# Patient Record
Sex: FEMALE | Race: WHITE | Employment: UNEMPLOYED | URBAN - METROPOLITAN AREA
[De-identification: names, ages, dates, MRNs, and addresses within clinical notes are randomized per-mention and may not be internally consistent; named-entity substitution may affect disease eponyms.]

---

## 2017-07-22 ENCOUNTER — HOSPITAL ENCOUNTER (EMERGENCY)
Age: 37
Discharge: HOME | End: 2017-07-22
Attending: EMERGENCY MEDICINE | Admitting: EMERGENCY MEDICINE
Payer: MEDICAID

## 2017-07-22 VITALS
BODY MASS INDEX: 27.77 KG/M2 | SYSTOLIC BLOOD PRESSURE: 124 MMHG | DIASTOLIC BLOOD PRESSURE: 76 MMHG | WEIGHT: 166.67 LBS | HEART RATE: 72 BPM | OXYGEN SATURATION: 98 % | HEIGHT: 65 IN | TEMPERATURE: 98.1 F | RESPIRATION RATE: 16 BRPM

## 2017-07-22 DIAGNOSIS — Z48.02 REMOVAL OF STAPLE: Primary | ICD-10-CM

## 2017-07-22 DIAGNOSIS — Z51.89 VISIT FOR WOUND CHECK: ICD-10-CM

## 2017-07-22 PROCEDURE — 77030008027

## 2017-07-22 PROCEDURE — 99282 EMERGENCY DEPT VISIT SF MDM: CPT

## 2017-07-22 NOTE — DISCHARGE INSTRUCTIONS
Learning About Stitches and Staples Removal  When are stitches and staples removed? Your doctor will tell you when to have your stitches or staples removed, usually in 7 to 14 days. How long you'll be told to wait will depend on things like where the wound is located, how big and how deep the wound is, and what your general health is like. Do not remove the stitches on your own. Stitches on the face are usually removed within a week. But stitches and staples on other areas of the body, such as on the back or belly or over a joint, may need to stay in place longer, often a week or two. Be sure to follow your doctor's instructions. How are stitches and staples removed? It usually doesn't hurt when the doctor removes the stitches or staples. You may feel a tug as each stitch or staple is removed. · You will either be seated or lying down. · To remove stitches, the doctor will use scissors to cut each of the knots and then pull the threads out. · To remove staples, the doctor will use a tool to take out the staples one at a time. · The area may still feel tender after the stitches or staples are gone. But it should feel better within a few minutes or up to a few hours. What can you expect after stitches and staples are removed? Depending on the type and location of the cut, you will have a scar. Scars usually fade over time. Keep the area clean, but you won't need a bandage. When should you call for help? Call your doctor now or seek immediate medical care if:  · You have new pain, or your pain gets worse. · You have trouble moving the area near the scar. · You have symptoms of infection, such as:  ¨ Increased pain, swelling, warmth, or redness around the scar. ¨ Red streaks leading from the scar. ¨ Pus draining from the scar. ¨ A fever. Watch closely for changes in your health, and be sure to contact your doctor if:  · The scar opens. · You do not get better as expected.   Follow-up care is a key part of your treatment and safety. Be sure to make and go to all appointments, and call your doctor if you do not get better as expected. It's also a good idea to keep a list of the medicines you take. Where can you learn more? Go to http://gualberto-evaristo.info/. Enter O812 in the search box to learn more about \"Learning About Stitches and Staples Removal.\"  Current as of: March 20, 2017  Content Version: 11.3  © 0718-1201 [employer]. Care instructions adapted under license by [employer] (which disclaims liability or warranty for this information). If you have questions about a medical condition or this instruction, always ask your healthcare professional. [address] any warranty or liability for your use of this information.

## 2017-07-22 NOTE — ED PROVIDER NOTES
HPI Comments: Jenny Bee is a 44 y.o. female with PMhx significant for fibromylagia, depression, bipolar, and schizophrenia who presents ambulatory to the ED for follow up and suture removal. She states she had sutures placed in her forehead at [facility] 1 month prior. She denies any issues or drainage from the sutures since the laceration repair. She reports tobacco use, but denies any EtOH and illicit drug use. Pt specifically denies any fever and chills. PCP: PROVIDER UNKNOWN    There are no other complaints, changes or physical findings at this time. The history is provided by the patient. Past Medical History:   Diagnosis Date    Attention and concentration deficit     Bipolar disorder (HCC)     Chronic fatigue     Chronic pain     Fibromyalgia 6 years ago    Dr. Leonardo Mcmahon voices     Dr. Avalos Levels Hx of depression headache     Post herpetic neuralgia     Schizophrenia ([facility] [address]     Shingles 2 weeks ago       No past surgical history on file. Family History:   Problem Relation Age of Onset    Diabetes Mother     Thyroid Disease Mother     COPD Mother     Stroke Paternal Grandfather     Heart Attack Father      At age 39   [address] Bipolar Disorder Father        Social History     Social History    Marital status: SINGLE     Spouse name: N/A    Number of children: N/A    Years of education: N/A     Occupational History    Not on file. Social History Main Topics    Smoking status: Current Every Day Smoker     Packs/day: 1.00     Years: 10.00    Smokeless tobacco: Never Used    Alcohol use Not on file      Comment: not really    Drug use: No    Sexual activity: No     Other Topics Concern    Not on file     Social History Narrative      Has 2 children         ALLERGIES: Review of patient's allergies indicates no known allergies. Review of Systems   Constitutional: Negative. Negative for chills and fever. HENT: Negative. Respiratory: Negative. Cardiovascular: Negative. Gastrointestinal: Negative. Genitourinary: Negative. Musculoskeletal: Negative. Skin: Negative. Neurological: Negative. All other systems reviewed and are negative. Vitals:    07/22/17 1346   BP: 124/76   Pulse: 72   Resp: 16   Temp: 98.1 °F (36.7 °C)   SpO2: 98%   Weight: 75.6 kg (166 lb 10.7 oz)   Height: 5' 5\" (1.651 m)            Physical Exam   Constitutional: She is oriented to person, place, and time. She appears well-developed and well-nourished. No distress. HENT:   Head: Normocephalic and atraumatic. Right Ear: External ear normal.   Left Ear: External ear normal.   Nose: Nose normal.   Mouth/Throat: Oropharynx is clear and moist. No oropharyngeal exudate. Eyes: Conjunctivae and EOM are normal. Pupils are equal, round, and reactive to light. Right eye exhibits no discharge. Left eye exhibits no discharge. No scleral icterus. Neck: Normal range of motion. Neck supple. No JVD present. No tracheal deviation present. Cardiovascular: Normal rate, regular rhythm, normal heart sounds and intact distal pulses. Exam reveals no gallop and no friction rub. No murmur heard. Pulmonary/Chest: Effort normal and breath sounds normal. No respiratory distress. She has no wheezes. She has no rales. She exhibits no tenderness. Abdominal: Soft. Bowel sounds are normal. She exhibits no distension and no mass. There is no tenderness. There is no rebound and no guarding. Musculoskeletal: Normal range of motion. She exhibits no edema or tenderness. Lymphadenopathy:     She has no cervical adenopathy. Neurological: She is alert and oriented to person, place, and time. She has normal reflexes. No cranial nerve deficit. She exhibits normal muscle tone. Coordination normal.   Skin: Skin is warm and dry. She is not diaphoretic. No wound dehiscence  No drainage, bleeding, or erythema   Psychiatric: She has a normal mood and affect.  Her behavior is normal. Judgment and thought content normal.   Nursing note and vitals reviewed. MDM  Number of Diagnoses or Management Options  Removal of staple:   Visit for wound check:   Diagnosis management comments: DDx: wound check, suture removal       Amount and/or Complexity of Data Reviewed  Review and summarize past medical records: yes    Patient Progress  Patient progress: stable    ED Course       Procedures    IMPRESSION:  1. Removal of staple    2. Visit for wound check        PLAN:  1. Discharge home  2. Follow-up Information     Follow up With Details Comments Contact Info    Provider Unknown In 2 days As needed Patient not available to ask          Return to ED if worse     DISCHARGE NOTE:  2:00 PM  The patient is ready for discharge. The patients signs, symptoms, diagnosis, and instructions for discharge have been discussed and the pt has conveyed their understanding. The patient is to follow up as recommended with PCP or return to the ER should their symptoms worsen. Plan has been discussed and patient has conveyed their agreement. This note is prepared by Oswald Boyd, acting as Scribe for [employer]. CHARLIE Rojo: The scribe's documentation has been prepared under my direction and personally reviewed by me in its entirety. I confirm that the note above accurately reflects all work, treatment, procedures, and medical decision making performed by me.

## 2018-09-26 ENCOUNTER — HOSPITAL ENCOUNTER (INPATIENT)
Age: 38
LOS: 3 days | Discharge: PSYCHIATRIC OR SUBSTANCE USE FACILITY | DRG: 042 | End: 2018-09-29
Attending: EMERGENCY MEDICINE | Admitting: INTERNAL MEDICINE
Payer: MEDICAID

## 2018-09-26 ENCOUNTER — APPOINTMENT (OUTPATIENT)
Dept: GENERAL RADIOLOGY | Age: 38
DRG: 042 | End: 2018-09-26
Attending: PHYSICIAN ASSISTANT
Payer: MEDICAID

## 2018-09-26 DIAGNOSIS — M25.551 RIGHT HIP PAIN: ICD-10-CM

## 2018-09-26 DIAGNOSIS — N39.0 URINARY TRACT INFECTION WITHOUT HEMATURIA, SITE UNSPECIFIED: ICD-10-CM

## 2018-09-26 DIAGNOSIS — R53.1 WEAKNESS: Primary | ICD-10-CM

## 2018-09-26 DIAGNOSIS — F99 PSYCHIATRIC ILLNESS: Chronic | ICD-10-CM

## 2018-09-26 PROBLEM — G24.9 DYSTONIA: Status: ACTIVE | Noted: 2018-09-26

## 2018-09-26 PROBLEM — G89.29 CHRONIC PAIN: Status: ACTIVE | Noted: 2018-09-26

## 2018-09-26 LAB
ALBUMIN SERPL-MCNC: 4.5 G/DL (ref 3.5–5)
ALBUMIN/GLOB SERPL: 1.3 {RATIO} (ref 1.1–2.2)
ALP SERPL-CCNC: 52 U/L (ref 45–117)
ALT SERPL-CCNC: 49 U/L (ref 12–78)
ANION GAP SERPL CALC-SCNC: 12 MMOL/L (ref 5–15)
APPEARANCE UR: ABNORMAL
AST SERPL-CCNC: 48 U/L (ref 15–37)
ATRIAL RATE: 85 BPM
BACTERIA URNS QL MICRO: ABNORMAL /HPF
BASOPHILS # BLD: 0 K/UL (ref 0–0.1)
BASOPHILS NFR BLD: 0 % (ref 0–1)
BILIRUB SERPL-MCNC: 0.9 MG/DL (ref 0.2–1)
BILIRUB UR QL CFM: NEGATIVE
BUN SERPL-MCNC: 9 MG/DL (ref 6–20)
BUN/CREAT SERPL: 8 (ref 12–20)
CALCIUM SERPL-MCNC: 9.7 MG/DL (ref 8.5–10.1)
CALCULATED R AXIS, ECG10: -17 DEGREES
CALCULATED T AXIS, ECG11: -27 DEGREES
CAOX CRY URNS QL MICRO: ABNORMAL
CHLORIDE SERPL-SCNC: 99 MMOL/L (ref 97–108)
CO2 SERPL-SCNC: 26 MMOL/L (ref 21–32)
COLOR UR: ABNORMAL
CREAT SERPL-MCNC: 1.06 MG/DL (ref 0.55–1.02)
DIAGNOSIS, 93000: NORMAL
DIFFERENTIAL METHOD BLD: ABNORMAL
EOSINOPHIL # BLD: 0 K/UL (ref 0–0.4)
EOSINOPHIL NFR BLD: 0 % (ref 0–7)
EPITH CASTS URNS QL MICRO: ABNORMAL /LPF
ERYTHROCYTE [DISTWIDTH] IN BLOOD BY AUTOMATED COUNT: 12.1 % (ref 11.5–14.5)
GLOBULIN SER CALC-MCNC: 3.6 G/DL (ref 2–4)
GLUCOSE SERPL-MCNC: 97 MG/DL (ref 65–100)
GLUCOSE UR STRIP.AUTO-MCNC: NEGATIVE MG/DL
HCG UR QL: NEGATIVE
HCT VFR BLD AUTO: 40.3 % (ref 35–47)
HGB BLD-MCNC: 14.2 G/DL (ref 11.5–16)
HGB UR QL STRIP: NEGATIVE
IMM GRANULOCYTES # BLD: 0 K/UL (ref 0–0.04)
IMM GRANULOCYTES NFR BLD AUTO: 0 % (ref 0–0.5)
KETONES UR QL STRIP.AUTO: 40 MG/DL
LEUKOCYTE ESTERASE UR QL STRIP.AUTO: ABNORMAL
LYMPHOCYTES # BLD: 1.6 K/UL (ref 0.8–3.5)
LYMPHOCYTES NFR BLD: 16 % (ref 12–49)
MAGNESIUM SERPL-MCNC: 2.1 MG/DL (ref 1.6–2.4)
MCH RBC QN AUTO: 32.2 PG (ref 26–34)
MCHC RBC AUTO-ENTMCNC: 35.2 G/DL (ref 30–36.5)
MCV RBC AUTO: 91.4 FL (ref 80–99)
MONOCYTES # BLD: 0.7 K/UL (ref 0–1)
MONOCYTES NFR BLD: 7 % (ref 5–13)
MUCOUS THREADS URNS QL MICRO: ABNORMAL /LPF
NEUTS SEG # BLD: 7.7 K/UL (ref 1.8–8)
NEUTS SEG NFR BLD: 76 % (ref 32–75)
NITRITE UR QL STRIP.AUTO: POSITIVE
NRBC # BLD: 0 K/UL (ref 0–0.01)
NRBC BLD-RTO: 0 PER 100 WBC
PH UR STRIP: 6 [PH] (ref 5–8)
PLATELET # BLD AUTO: 254 K/UL (ref 150–400)
PMV BLD AUTO: 10.4 FL (ref 8.9–12.9)
POTASSIUM SERPL-SCNC: 3.6 MMOL/L (ref 3.5–5.1)
PROT SERPL-MCNC: 8.1 G/DL (ref 6.4–8.2)
PROT UR STRIP-MCNC: ABNORMAL MG/DL
Q-T INTERVAL, ECG07: 348 MS
QRS DURATION, ECG06: 62 MS
QTC CALCULATION (BEZET), ECG08: 408 MS
RBC # BLD AUTO: 4.41 M/UL (ref 3.8–5.2)
RBC #/AREA URNS HPF: ABNORMAL /HPF (ref 0–5)
SODIUM SERPL-SCNC: 137 MMOL/L (ref 136–145)
SP GR UR REFRACTOMETRY: 1.02 (ref 1–1.03)
UA: UC IF INDICATED,UAUC: ABNORMAL
UROBILINOGEN UR QL STRIP.AUTO: 1 EU/DL (ref 0.2–1)
VENTRICULAR RATE, ECG03: 83 BPM
WBC # BLD AUTO: 10.1 K/UL (ref 3.6–11)
WBC URNS QL MICRO: ABNORMAL /HPF (ref 0–4)

## 2018-09-26 PROCEDURE — 87077 CULTURE AEROBIC IDENTIFY: CPT | Performed by: PHYSICIAN ASSISTANT

## 2018-09-26 PROCEDURE — 36415 COLL VENOUS BLD VENIPUNCTURE: CPT | Performed by: PHYSICIAN ASSISTANT

## 2018-09-26 PROCEDURE — 97116 GAIT TRAINING THERAPY: CPT

## 2018-09-26 PROCEDURE — 97161 PT EVAL LOW COMPLEX 20 MIN: CPT

## 2018-09-26 PROCEDURE — G8980 MOBILITY D/C STATUS: HCPCS

## 2018-09-26 PROCEDURE — 81001 URINALYSIS AUTO W/SCOPE: CPT | Performed by: PHYSICIAN ASSISTANT

## 2018-09-26 PROCEDURE — 74011250637 HC RX REV CODE- 250/637: Performed by: HOSPITALIST

## 2018-09-26 PROCEDURE — 87086 URINE CULTURE/COLONY COUNT: CPT | Performed by: PHYSICIAN ASSISTANT

## 2018-09-26 PROCEDURE — 74011250636 HC RX REV CODE- 250/636: Performed by: HOSPITALIST

## 2018-09-26 PROCEDURE — 96361 HYDRATE IV INFUSION ADD-ON: CPT

## 2018-09-26 PROCEDURE — 74011250636 HC RX REV CODE- 250/636: Performed by: INTERNAL MEDICINE

## 2018-09-26 PROCEDURE — 83735 ASSAY OF MAGNESIUM: CPT | Performed by: PHYSICIAN ASSISTANT

## 2018-09-26 PROCEDURE — 74011250637 HC RX REV CODE- 250/637: Performed by: INTERNAL MEDICINE

## 2018-09-26 PROCEDURE — 96374 THER/PROPH/DIAG INJ IV PUSH: CPT

## 2018-09-26 PROCEDURE — 74011250637 HC RX REV CODE- 250/637: Performed by: PHYSICIAN ASSISTANT

## 2018-09-26 PROCEDURE — 85025 COMPLETE CBC W/AUTO DIFF WBC: CPT | Performed by: PHYSICIAN ASSISTANT

## 2018-09-26 PROCEDURE — 65660000000 HC RM CCU STEPDOWN

## 2018-09-26 PROCEDURE — 96372 THER/PROPH/DIAG INJ SC/IM: CPT

## 2018-09-26 PROCEDURE — 80053 COMPREHEN METABOLIC PANEL: CPT | Performed by: PHYSICIAN ASSISTANT

## 2018-09-26 PROCEDURE — 81025 URINE PREGNANCY TEST: CPT

## 2018-09-26 PROCEDURE — 99285 EMERGENCY DEPT VISIT HI MDM: CPT

## 2018-09-26 PROCEDURE — G8979 MOBILITY GOAL STATUS: HCPCS

## 2018-09-26 PROCEDURE — 93005 ELECTROCARDIOGRAM TRACING: CPT

## 2018-09-26 PROCEDURE — G8978 MOBILITY CURRENT STATUS: HCPCS

## 2018-09-26 PROCEDURE — 74011000258 HC RX REV CODE- 258: Performed by: INTERNAL MEDICINE

## 2018-09-26 PROCEDURE — 87186 SC STD MICRODIL/AGAR DIL: CPT | Performed by: PHYSICIAN ASSISTANT

## 2018-09-26 PROCEDURE — 74011250636 HC RX REV CODE- 250/636: Performed by: PHYSICIAN ASSISTANT

## 2018-09-26 RX ORDER — KETOROLAC TROMETHAMINE 30 MG/ML
15 INJECTION, SOLUTION INTRAMUSCULAR; INTRAVENOUS
Status: COMPLETED | OUTPATIENT
Start: 2018-09-26 | End: 2018-09-26

## 2018-09-26 RX ORDER — ASPIRIN 81 MG/1
81 TABLET ORAL
COMMUNITY

## 2018-09-26 RX ORDER — IBUPROFEN 200 MG
200 TABLET ORAL
Status: DISCONTINUED | OUTPATIENT
Start: 2018-09-26 | End: 2018-09-29 | Stop reason: HOSPADM

## 2018-09-26 RX ORDER — IBUPROFEN 200 MG
200 TABLET ORAL
COMMUNITY

## 2018-09-26 RX ORDER — SODIUM CHLORIDE 0.9 % (FLUSH) 0.9 %
5-10 SYRINGE (ML) INJECTION AS NEEDED
Status: DISCONTINUED | OUTPATIENT
Start: 2018-09-26 | End: 2018-09-29 | Stop reason: HOSPADM

## 2018-09-26 RX ORDER — KETOROLAC TROMETHAMINE 10 MG/1
10 TABLET, FILM COATED ORAL
Qty: 15 TAB | Refills: 0 | Status: SHIPPED | OUTPATIENT
Start: 2018-09-26 | End: 2018-09-29

## 2018-09-26 RX ORDER — ONDANSETRON 2 MG/ML
4 INJECTION INTRAMUSCULAR; INTRAVENOUS
Status: DISCONTINUED | OUTPATIENT
Start: 2018-09-26 | End: 2018-09-29 | Stop reason: HOSPADM

## 2018-09-26 RX ORDER — BENZTROPINE MESYLATE 1 MG/ML
1 INJECTION INTRAMUSCULAR; INTRAVENOUS EVERY 12 HOURS
Status: COMPLETED | OUTPATIENT
Start: 2018-09-26 | End: 2018-09-27

## 2018-09-26 RX ORDER — CEPHALEXIN 250 MG/1
500 CAPSULE ORAL
Status: COMPLETED | OUTPATIENT
Start: 2018-09-26 | End: 2018-09-26

## 2018-09-26 RX ORDER — DIPHENHYDRAMINE HCL 25 MG
50-100 CAPSULE ORAL
COMMUNITY
End: 2018-10-05

## 2018-09-26 RX ORDER — ACETAMINOPHEN 325 MG/1
650 TABLET ORAL
Status: DISCONTINUED | OUTPATIENT
Start: 2018-09-26 | End: 2018-09-29 | Stop reason: HOSPADM

## 2018-09-26 RX ORDER — SODIUM CHLORIDE 0.9 % (FLUSH) 0.9 %
5-10 SYRINGE (ML) INJECTION EVERY 8 HOURS
Status: DISCONTINUED | OUTPATIENT
Start: 2018-09-26 | End: 2018-09-29 | Stop reason: HOSPADM

## 2018-09-26 RX ORDER — ZOLPIDEM TARTRATE 5 MG/1
5 TABLET ORAL ONCE
Status: COMPLETED | OUTPATIENT
Start: 2018-09-26 | End: 2018-09-26

## 2018-09-26 RX ORDER — KETOROLAC TROMETHAMINE 30 MG/ML
15 INJECTION, SOLUTION INTRAMUSCULAR; INTRAVENOUS
Status: DISCONTINUED | OUTPATIENT
Start: 2018-09-26 | End: 2018-09-26

## 2018-09-26 RX ORDER — CEPHALEXIN 500 MG/1
500 CAPSULE ORAL 2 TIMES DAILY
Qty: 14 CAP | Refills: 0 | Status: SHIPPED | OUTPATIENT
Start: 2018-09-26 | End: 2018-09-29

## 2018-09-26 RX ORDER — DIPHENHYDRAMINE HYDROCHLORIDE 50 MG/ML
25 INJECTION, SOLUTION INTRAMUSCULAR; INTRAVENOUS
Status: COMPLETED | OUTPATIENT
Start: 2018-09-26 | End: 2018-09-26

## 2018-09-26 RX ORDER — HEPARIN SODIUM 5000 [USP'U]/ML
5000 INJECTION, SOLUTION INTRAVENOUS; SUBCUTANEOUS EVERY 8 HOURS
Status: DISCONTINUED | OUTPATIENT
Start: 2018-09-26 | End: 2018-09-29 | Stop reason: HOSPADM

## 2018-09-26 RX ORDER — ASPIRIN 81 MG/1
81 TABLET ORAL
Status: DISCONTINUED | OUTPATIENT
Start: 2018-09-26 | End: 2018-09-29 | Stop reason: HOSPADM

## 2018-09-26 RX ADMIN — KETOROLAC TROMETHAMINE 15 MG: 30 INJECTION, SOLUTION INTRAMUSCULAR at 21:30

## 2018-09-26 RX ADMIN — ZOLPIDEM TARTRATE 5 MG: 5 TABLET ORAL at 21:28

## 2018-09-26 RX ADMIN — CEFTRIAXONE 1 G: 1 INJECTION, POWDER, FOR SOLUTION INTRAMUSCULAR; INTRAVENOUS at 18:04

## 2018-09-26 RX ADMIN — KETOROLAC TROMETHAMINE 15 MG: 30 INJECTION, SOLUTION INTRAMUSCULAR at 12:18

## 2018-09-26 RX ADMIN — BENZTROPINE MESYLATE 1 MG: 1 INJECTION, SOLUTION INTRAMUSCULAR; INTRAVENOUS at 18:27

## 2018-09-26 RX ADMIN — SODIUM CHLORIDE 1000 ML: 900 INJECTION, SOLUTION INTRAVENOUS at 13:25

## 2018-09-26 RX ADMIN — HEPARIN SODIUM 5000 UNITS: 5000 INJECTION INTRAVENOUS; SUBCUTANEOUS at 21:28

## 2018-09-26 RX ADMIN — ACETAMINOPHEN 650 MG: 325 TABLET ORAL at 18:03

## 2018-09-26 RX ADMIN — Medication 10 ML: at 21:29

## 2018-09-26 RX ADMIN — DIPHENHYDRAMINE HYDROCHLORIDE 25 MG: 50 INJECTION, SOLUTION INTRAMUSCULAR; INTRAVENOUS at 14:02

## 2018-09-26 RX ADMIN — Medication 10 ML: at 13:27

## 2018-09-26 RX ADMIN — CEPHALEXIN 500 MG: 250 CAPSULE ORAL at 16:09

## 2018-09-26 NOTE — ED NOTES
Pt medicated as ordered. RN called dietary to check on pt food tray, which should be here shortly. No further needs expressed.

## 2018-09-26 NOTE — ED NOTES
Pt medicated as ordered. IV placed in right hand. Pt not able to urinate. No further needs expressed.

## 2018-09-26 NOTE — IP AVS SNAPSHOT
[address] [facility] 
[phone] Patient: Argenis Alanis MRN: [id number] WVA:[birth year] About your hospitalization You were admitted on:  September 26, 2018 You last received care in the:  [facility] NEUROSCIENCE TELEMETRY You were discharged on:  September 29, 2018 Why you were hospitalized Your primary diagnosis was:  Not on File Your diagnoses also included:  Dystonia, Uti (Urinary Tract Infection), Chronic Pain, Fibromyalgia, Psychiatric Illness Follow-up Information Follow up With Details Comments Contact Info [place] Medicaid Transportation   CALL THIS NUMBER 3 DAYS PRIOR TO APPOINTMENTS FOR TRANSPORTATION TO AND FROM APPOINTMENTS [phone] Gail Minor MD Go on 10/4/2018 Please follow up on October 4, 2018 1:00 arriving at 12:30 to register as a new patient with photo ID, insurance card and current list of medication  [address] [facility] [address] [phone] Dick Cazares MD   [address] [place] [address] 
[phone] Your Scheduled Appointments Thursday October 04, 2018  1:00 PM EDT PHYSICAL PRE OP with Gail Minor MD  
. [address] [facility] 205 ([facility]) [address], [address] [address]  
[phone] Discharge Orders None A check wiliam indicates which time of day the medication should be taken. My Medications START taking these medications Instructions Each Dose to Equal  
 Morning Noon Evening Bedtime  
 benztropine 1 mg tablet Commonly known as:  COGENTIN Your last dose was: Your next dose is: Take 1 Tab by mouth two (2) times a day. 1 mg  
    
   
   
   
  
 zolpidem 5 mg tablet Commonly known as:  AMBIEN Your last dose was: Your next dose is: Take 1 Tab by mouth nightly as needed for Sleep. Max Daily Amount: 5 mg.  
 5 mg CONTINUE taking these medications Instructions Each Dose to Equal  
 Morning Noon Evening Bedtime  
 aspirin delayed-release 81 mg tablet Your last dose was: Your next dose is: Take 81 mg by mouth daily as needed for Pain. 81 mg  
    
   
   
   
  
 BENADRYL 25 mg capsule Generic drug:  diphenhydrAMINE Your last dose was: Your next dose is: Take  mg by mouth nightly.  mg  
    
   
   
   
  
 ibuprofen 200 mg tablet Commonly known as:  MOTRIN Your last dose was: Your next dose is: Take 200 mg by mouth every four (4) hours as needed for Pain. 200 mg Where to Get Your Medications Information on where to get these meds will be given to you by the nurse or doctor. ! Ask your nurse or doctor about these medications  
  benztropine 1 mg tablet  
 zolpidem 5 mg tablet Discharge Instructions HOSPITALIST DISCHARGE INSTRUCTIONS 
 
NAME: Saroj Mckeon :  1980 MRN:  [id number] Date/Time:  2018 1:17 PM 
 
ADMIT DATE: 2018 DISCHARGE DATE: 2018 DISCHARGE DIAGNOSIS: 
Difficulty moving all 4 extremities POA- atypical symptoms on Haldol maintenance therapy, cleared by PT/OT for [facility] this admission Suspect dystonia from haldol?- started on Cogentin BID as per Dr Laci Castillo recommendations Psychiatric illness/schizophrenia POA- was started on haldol monthly shots when she was DC from [facility] 1 month ago- now stopped for now, IP Psych recommended for meds optimization per Psychiatry evaluation- Dr Farhad Rasmussen- transfer to [address] [place] [address] Fibromyalgia POA Chronic pain syndrome POA- avoid opioids Probable UTI (urinary tract infection) POA- ruled out with neg Cx (coag neg staph- likely contaminant)- s/p rocephin here, now off it Active Problems: 
  Fibromyalgia (7/12/2012) Dystonia (9/26/2018) UTI (urinary tract infection) (9/26/2018) Chronic pain (9/26/2018) Psychiatric illness (9/26/2018) MEDICATIONS: 
As per medication reconciliation  list 
· It is important that you take the medication exactly as they are prescribed. · Keep your medication in the bottles provided by the pharmacist and keep a list of the medication names, dosages, and times to be taken in your wallet. · Do not take other medications without consulting your doctor. Pain Management: per above medications What to do at [facility] Recommended diet:  Regular Diet Recommended activity: Activity as tolerated If you have questions regarding the hospital related prescriptions or hospital related issues please call Eleazar Henry at . Information obtained by : 
I understand that if any problems occur once I am at home I am to contact my physician. I understand and acknowledge receipt of the instructions indicated above. Physician's or R.N.'s Signature                                                                  Date/Time Patient or Representative Signature                                                          Date/Time [employer] Announcement We are excited to announce that we are making your provider's discharge notes available to you in [employer]. You will see these notes when they are completed and signed by the physician that discharged you from your recent hospital stay.   If you have any questions or concerns about any information you see in [employer], please call the Health Information Department where you were seen or reach out to your Primary Care Provider for more information about your plan of care. Introducing [facility] & HEALTH SERVICES! [address] introduces [employer] patient portal. Now you can access parts of your medical record, email your doctor's office, and request medication refills online. 1. In your internet browser, go to [email or web address] 2. Click on the First Time User? Click Here link in the Sign In box. You will see the New Member Sign Up page. 3. Enter your [employer] Access Code exactly as it appears below. You will not need to use this code after youve completed the sign-up process. If you do not sign up before the expiration date, you must request a new code. · [employer] Access Code: [email or web address] Expires: 12/25/2018 10:47 AM 
 
4. Enter the last four digits of your Social Security Number (xxxx) and Date of Birth (mm/dd/yyyy) as indicated and click Submit. You will be taken to the next sign-up page. 5. Create a [employer] ID. This will be your [employer] login ID and cannot be changed, so think of one that is secure and easy to remember. 6. Create a [employer] password. You can change your password at any time. 7. Enter your Password Reset Question and Answer. This can be used at a later time if you forget your password. 8. Enter your e-mail address. You will receive e-mail notification when new information is available in [address] 9. Click Sign Up. You can now view and download portions of your medical record. 10. Click the Download Summary menu link to download a portable copy of your medical information. If you have questions, please visit the Frequently Asked Questions section of the [employer] website. Remember, [employer] is NOT to be used for urgent needs. For medical emergencies, dial 911. Now available from your iPhone and Android! Introducing Adrian Reilly As a [facility] patient, I wanted to make you aware of our electronic visit tool called Adrian Campbellneedmade. [employer] allows you to connect within minutes with a medical provider 24 hours a day, seven days a week via a mobile device or tablet or logging into a secure website from your computer. You can access Adrian Campbellfin from anywhere in the [place]. A virtual visit might be right for you when you have a simple condition and feel like you just dont want to get out of bed, or cant get away from work for an appointment, when your regular [facility] provider is not available (evenings, weekends or holidays), or when youre out of town and need minor care. Electronic visits cost only $49 and if the [employer] provider determines a prescription is needed to treat your condition, one can be electronically transmitted to a nearby pharmacy*. Please take a moment to enroll today if you have not already done so. The enrollment process is free and takes just a few minutes. To enroll, please download the [employer] mihir to your tablet or phone, or visit [email or web address] to enroll on your computer. And, as an [address] [place] [address] patient with a [employer] account, the results of your visits will be scanned into your electronic medical record and your primary care provider will be able to view the scanned results. We urge you to continue to see your regular [facility] provider for your ongoing medical care. And while your primary care provider may not be the one available when you seek a Adrian Mejiakalyanfin virtual visit, the peace of mind you get from getting a real diagnosis real time can be priceless. For more information on Adrian Robertokalyanfin, view our Frequently Asked Questions (FAQs) at [email or web address]. Sincerely, 
 
Arvell Najjar, MD 
Chief Medical Officer [employer] *:  certain medications cannot be prescribed via Adrian Reilly Providers Seen During Your Hospitalization Provider Specialty Primary office phone Nita Arnold MD Emergency Medicine [phone] Demario Conte MD Internal Medicine [phone] Vane Mosher MD Internal Medicine [phone] Immunizations Administered for This Admission Name Date Influenza Vaccine (Quad) PF 9/29/2018 Your Primary Care Physician (PCP) Primary Care Physician Office Phone Office Fax Tc Sullivan [phone] You are allergic to the following No active allergies Recent Documentation Height Weight Breastfeeding? BMI OB Status Smoking Status 1.651 m 79.4 kg No 29.12 kg/m2 Implant Current Every Day Smoker Emergency Contacts Name Discharge Info Relation Home Work Mobile [address] CAREGIVER [3] Mother [14] [phone] Patient Belongings The following personal items are in your possession at time of discharge: 
  Dental Appliances: None  Visual Aid: Glasses, With patient      Home Medications: None   Jewelry: None  Clothing: Shirt, Pants, Slippers    Other Valuables: Cell Phone, Purse (she will keep it with her refasued to put purse  saf)  Personal Items Sent to Safe: None Please provide this summary of care documentation to your next provider. Signatures-by signing, you are acknowledging that this After Visit Summary has been reviewed with you and you have received a copy. Patient Signature:  ____________________________________________________________ Date:  ____________________________________________________________  
  
Rigo Summers Provider Signature:  ____________________________________________________________ Date:  ____________________________________________________________

## 2018-09-26 NOTE — ED NOTES
TRANSFER - OUT REPORT: 
 
Verbal report given to Soco Jacome RN (name) on Saroj Mckeon  being transferred to [facility] - Neuro Tele(unit) for routine progression of care Report consisted of patients Situation, Background, Assessment and  
Recommendations(SBAR). Information from the following report(s) SBAR, Kardex, ED Summary, [facility] and Recent Results was reviewed with the receiving nurse. Lines:  
Peripheral IV 09/26/18 Right Hand (Active) Site Assessment Clean, dry, & intact 9/26/2018  1:25 PM  
Phlebitis Assessment 0 9/26/2018  1:25 PM  
Infiltration Assessment 0 9/26/2018  1:25 PM  
Dressing Status Clean, dry, & intact 9/26/2018  1:25 PM  
Dressing Type Transparent 9/26/2018  1:25 PM  
Hub Color/Line Status Pink;Flushed 9/26/2018  1:25 PM  
  
 
Opportunity for questions and clarification was provided. Patient transported with: 
 [employer]

## 2018-09-26 NOTE — ED NOTES
Pt via EMS to ED. Pt c/o of tremors that began a month ago. Pt reports tremors have gotten worse to the point where she can no longer care for herself. Pt reports she was hospitalized in August and was given her haloperidol shots. Pt reports hx of bipolar and schizophrenia. Pt denies falling, injury, drug use and alcohol use. Pt reports her right hip is in pain. Pt reports taking benadryl last night for the tremors with some relief.

## 2018-09-26 NOTE — ED NOTES
Medicated as ordered; water provided to drink. PT at bedside facilitating transfer to [facility].

## 2018-09-26 NOTE — PROGRESS NOTES
physical Therapy Emergency Department EVALUATION with CMS G codes Physical Therapy Goals Initiated 9/27/2018 1. Patient will move from supine to sit and sit to supine , scoot up and down and roll side to side in bed with independence within 7 day(s). 2.  Patient will transfer from bed to chair and chair to bed with independence using the least restrictive device within 7 day(s). 3.  Patient will perform sit to stand with independence within 7 day(s). 4.  Patient will ambulate with modified independence for 200 feet with the least restrictive device within 7 day(s). 5.  Patient will ascend/descend 4 stairs with handrail(s) with modified independence within 7 day(s). Patient: Pop Robles (89 y.o. female) Date: 9/26/2018 Primary Diagnosis: There are no admission diagnoses documented for this encounter. Precautions:     
ASSESSMENT : 
Based on the objective data described below, the patient presents with limitations in functional mobility, gait, and activity tolerance. Asked by LOURDES Acosta for eval. 
Pt lives in one story home with 3 steps to enter. She states she could previously function independently without device. When asked how long ago this was, she states one month. Per PA, pt posturing UEs when he was in room, none noted upon PT entry. Pt posturing feet in plantarflexion/inversion but readily moves out of pattern without difficulty when asked to humberto flip flops. Pt able to take resist BLE with isolated movment. However, when asked to dorsiflex directly, moves very little. Pt presents with intermittent tremoring of UEs. Pt able to come to EOB with CGA. She needed only slight min A to scoot L hip fully to EOB. Pt transferred to [facility] with min A of 1. She was able to complete toileting and hygiene using RUE without assist. Pt completes sit to stand with CGA to min A of 1. She amb initially with min A of 2, HHA, with cues to increase postural extension and lengthen steps. After 30', pt able to amb with CGA of 2 only and continued cues. Upon turning to return to room, pt began tremor and increased forward flexion. Given verbal cues and assurance and min A of 2 HHA to aid in correction, pt then able to amb back to room with min A and increased step length, increased pace. Pt sat on EOB; given stool under feet and pt was able to independently push self back on bed using BLEs and BUEs without difficulty. Pt then asked to lie back down on bed and stated \"I can't\". Required mod A of 1 to return to supine. Note: pt drooling for second half of walk; when asked to swallow saliva she states she is trying but can't. Then asks for water and drinks well per nursing. At this time, pt showing inconsistent behaviors but is not presenting able to function w/o assist. Does not appear that assistive device would necessarily decrease fall risk given above description; (9/27 - could trial next session as appropriate). Pt noted to have limited f/u with mental health services; question to what degree this is contributing to her current presentation. Rounded with PA, MD, and CM given pt stating she does not have needed assist at home. CM and PA meeting with pt. If she returns home as PA expects d/c from ED, would recommend close follow with home health services to fully assess function in the home and assist as can be provided with resources. 9/27/18: Pt has been admitted to the hospital. While in acute setting she will benefit from PT to address issues 4x/week. Goals added to POC as noted above. Recommend HH f/u depending up progress. PLAN : 
Discharge Recommendations:  
 
[x]   Home with family/assist 
[]   Skilled nursing facility []   Admission to hospital with rehab likely needed 
[]   Inpatient rehab referral 
[]   Outpatient physical therapy referral 
[x]   Other:HHPT/OT Further Equipment Recommendations for Discharge:  
[]   Rolling walker with 5\" wheels 
[]   Crutches []   Ghazala Michael  
[]   Wheelchair  
[x]   Other: TBD  
 
COMMUNICATION/EDUCATION:  
Communication/Collaboration: 
[x]   Fall prevention education was provided and the patient/caregiver indicated understanding. [x]   Patient/family have participated as able and agree with findings and recommendations. []   Patient is unable to participate in plan of care at this time. Findings and recommendations were discussed with: MD physician and Registered Nurse and  SUBJECTIVE:  
Patient stated I can't.  OBJECTIVE DATA SUMMARY:  
 
Past Medical History:  
Diagnosis Date  Attention and concentration deficit  Bipolar disorder ([facility] [address]  Chronic fatigue  Chronic pain  Fibromyalgia 6 years ago Dr. Ovi Cruz  Hearing voices Dr. Margaret Mandujano  Hx of depression headache  Post herpetic neuralgia  Schizophrenia ([facility] [address]  Shingles 2 weeks ago History reviewed. No pertinent surgical history. Prior Level of Function/Home Situation: Pt lives in one story home with 3 steps to enter. She states she could previously function independently without device. When asked how long ago this was, she states one month. Home Situation Home Environment: Private residence # Steps to Enter: 3 Rails to Enter: Yes One/Two Story Residence: One story Living Alone: No 
Support Systems: Friends \ neighbors (roommate) Patient Expects to be Discharged to[de-identified] Private residence Current DME Used/Available at Home: None Critical Behavior: 
Neurologic State: Alert Orientation Level: Oriented X4 Cognition: Follows commands Strength:   
Strength: Within functional limits (takes resist to LEs; able to isolate mvt) Tone & Sensation:  
Tone:  (postures but moves readily out) Sensation:  (difficult to assess due to pt's demeanor) Range Of Motion: 
AROM:  (limited testing; appears WFL during functional tasks) PROM: Within functional limits Coordination: 
Coordination:  (tremors intermittent) Functional Mobility: 
Bed Mobility: 
  
Supine to Sit: Contact guard assistance (only very little assist at) Sit to Supine: Moderate assistance (States \"I can't\") Transfers: 
Sit to Stand: Contact guard assistance;Minimum assistance;Assist x1 Stand to Sit: Contact guard assistance (given stool able to independently scoot self back on bed) Balance:  
Sitting: Intact Standing: Impaired Standing - Static: Fair Standing - Dynamic : Poor Ambulation/Gait Training: 
Distance (ft): 90 Feet (ft) Assistive Device: Gait belt; Other (comment) (HHA) Ambulation - Level of Assistance: Minimal assistance;Contact guard assistance (CGA to min A of 1-2) Gait Abnormalities: Decreased step clearance (flexed posture) Base of Support: Narrowed (COG shifted forward) Speed/Emma: Pace decreased (<100 feet/min); Shuffled; Slow Step Length: Right shortened;Left shortened Functional Measure: 
Barthel Index: 
 
Bathin Bladder: 10 Bowels: 10 
Groomin Dressin Feeding: 10 Mobility: 10 Stairs: 5 Toilet Use: 5 Transfer (Bed to Chair and Back): 10 Total: 65 Barthel and G-code impairment scale: 
Percentage of impairment CH 
0% CI 
1-19% CJ 
20-39% CK 
40-59% CL 
60-79% CM 
80-99% CN 
100% Barthel Score 0-100 100 99-80 79-60 59-40 20-39 1-19 
 0 Barthel Score 0-20 20 17-19 13-16 9-12 5-8 1-4 0 The Barthel ADL Index: Guidelines 1. The index should be used as a record of what a patient does, not as a record of what a patient could do. 2. The main aim is to establish degree of independence from any help, physical or verbal, however minor and for whatever reason. 3. The need for supervision renders the patient not independent. 4. A patient's performance should be established using the best available evidence.  Asking the patient, friends/relatives and nurses are the usual sources, but direct observation and common sense are also important. However direct testing is not needed. 5. Usually the patient's performance over the preceding 24-48 hours is important, but occasionally longer periods will be relevant. 6. Middle categories imply that the patient supplies over 50 per cent of the effort. 7. Use of aids to be independent is allowed. Teena Tello., Barthel, D.W. (1433). Functional evaluation: the Barthel Index. [facility] (14)2. CHARLES Ryan, Esequiel Padilla.Katelynn., [place], [address] (1999). Measuring the change indisability after inpatient rehabilitation; comparison of the responsiveness of the Barthel Index and Functional [place] Measure. Journal of Neurology, Neurosurgery, and Psychiatry, 66(4), 118-592. JULIAN Arriola, RAMÓN Perkins, & Fabian Aceves MAndreasA. (2004.) Assessment of post-stroke quality of life in cost-effectiveness studies: The usefulness of the Barthel Index and the EuroQoL-5D. [facility], 13, 737-50 In compliance with CMSs Claims Based Outcome Reporting, the following G-code set was chosen for this patient based on their primary functional limitation being treated: The outcome measure chosen to determine the severity of the functional limitation was the Barthel with a score of 65/100 which was correlated with the impairment scale. ? Mobility - Walking and Moving Around:  
  - CURRENT STATUS: CJ - 20%-39% impaired, limited or restricted  - GOAL STATUS: CJ - 20%-39% impaired, limited or restricted  - D/C STATUS:  CJ - 20%-39% impaired, limited or restricted Pain: 
Pain Scale 1: Numeric (0 - 10) Pain Intensity 1: 7 Pain Location 1: Hip;Knee Pain Orientation 1: Right Pain Description 1: Burning;Aching Pain Intervention(s) 1: Emotional support Activity Tolerance:  
See above narrative Please refer to the flowsheet for vital signs taken during this treatment. After treatment:  
[]         Patient left in no apparent distress sitting up in chair 
[x]         Patient left in no apparent distress in bed 
[x]         Call bell left within reach [x]         Nursing notified 
[x]         Caregiver present 
[]         Bed alarm activated Thank you for this referral. 
Miranda Hernandez, PT Time Calculation: 24 mins

## 2018-09-26 NOTE — H&P
Hospitalist Admission Note NAME: Andry Alonso :  1980 MRN:  [id number] Date/Time:  2018 5:50 PM 
 
Patient PCP: PROVIDER UNKNOWN 
______________________________________________________________________ Given the patient's current clinical presentation, I have a high level of concern for decompensation if discharged from the emergency department. Complex decision making was performed, which includes reviewing the patient's available past medical records, laboratory results, and x-ray films. My assessment of this patient's clinical condition and my plan of care is as follows. Assessment / Plan: 
Difficulty moving all 4 extremities Suspect dystonia from haldol Pt takes month haldol injections and claims symptoms started after the dose of haldol that she received after the dose of haldol and she didn't took recent dose considering she was concern that she had a side affect from the haldol Long acting half life is 25 days so she should still have > 25% in her system and likely reason not getting better This is the 2nd ED visit  For the same for worsening symptoms UDS, pregnancy test and CT head negative on  at outside ED facility (I reviewed paper work that she has with her) I called and discussed the case with psychiatry Dr Victoria Jensen, I will give her 1mg IM Benztropin and repeat dose in 12 hours Will monitor on neurotele Dr Damion Hansen to see her personally tomorrow If Benztroponin doesn't work and if psychiatry wants to rule out neurological problems then consider MRI with and without contrast for head and whole spine to rule out MS considering yound age and ask neurology consult. May also need LP 
PT/OT Fibromyalgia Chronic pain Avoid narcotic PRN tylenol and motrin for now 
 
? UTI (urinary tract infection) IV rocephin and f/u cultures Psychiatric illness suspect schizophrenia as was on haldol monthly shots Psych to see the patient as above Code Status: full code Surrogate Decision Maker; Parents DVT Prophylaxis: Lovenox Baseline: functional  
  
Subjective: CHIEF COMPLAINT: difficulty moving all 4 extrimities HISTORY OF PRESENT ILLNESS:    
Melani Bravo is a 43 y.o.  female who presents with difficulty moving all 4 extremities. As per patient, this all started a month ago after haldol and she didn't take her haldol this month as she believe this is all related to haldol. Pt reported going to ED at [address] read in [facility] and after negative workup, sent home. He weakness continues and she called EMS and came to ED [facility] ED for evaluation. Pt denies any fever, chills, nausea, vomiting, diarrhea, chest pain, cough, problems urination. She also reports pain in her legs which is been there for long time. She showed me paper work from [facility] and over there CT head and UDS was negative. We were asked to admit for work up and evaluation of the above problems. Past Medical History:  
Diagnosis Date  Attention and concentration deficit  Bipolar disorder ([facility] [address]  Chronic fatigue  Chronic pain  Fibromyalgia 6 years ago Dr. Johanny Dinero  Hearing voices Dr. Eden Andre  Hx of depression headache  Post herpetic neuralgia  Schizophrenia ([facility] [address]  Shingles 2 weeks ago Past surgical history: no history of any surgeries Social History Substance Use Topics  Smoking status: Current Every Day Smoker Packs/day: 1.00 Years: 10.00  Smokeless tobacco: Never Used  Alcohol use Not on file Comment: not really Family History Problem Relation Age of Onset  Diabetes Mother  Thyroid Disease Mother  COPD Mother  Stroke Paternal Grandfather  Heart Attack Father At age 39  Bipolar Disorder Father No Known Allergies Prior to Admission medications Medication Sig Start Date End Date Taking? Authorizing Provider cephALEXin (KEFLEX) 500 mg capsule Take 1 Cap by mouth two (2) times a day for 7 days. 9/26/18 10/3/18 Yes LOURDES Juarez  
ketorolac (TORADOL) 10 mg tablet Take 1 Tab by mouth every six (6) hours as needed for Pain. 9/26/18  Yes LOURDES Juarez  
diphenhydrAMINE (BENADRYL) 25 mg capsule Take  mg by mouth nightly. Yes Isi Lynn MD  
aspirin delayed-release 81 mg tablet Take 81 mg by mouth daily as needed for Pain. Yes Isi Lynn MD  
ibuprofen (MOTRIN) 200 mg tablet Take 200 mg by mouth every four (4) hours as needed for Pain. Yes Isi Lynn MD  
 
 
REVIEW OF SYSTEMS:    
I am not able to complete the review of systems because: The patient is intubated and sedated The patient has altered mental status due to his acute medical problems The patient has baseline aphasia from prior stroke(s) The patient has baseline dementia and is not reliable historian The patient is in acute medical distress and unable to provide information Total of 12 systems reviewed as follows:   
   POSITIVE= underlined text  Negative = text not underlined General:  fever, chills, sweats, generalized weakness, weight loss/gain,  
   loss of appetite Eyes:    blurred vision, eye pain, loss of vision, double vision ENT:    rhinorrhea, pharyngitis Respiratory:   cough, sputum production, SOB, HEATON, wheezing, pleuritic pain  
Cardiology:   chest pain, palpitations, orthopnea, PND, edema, syncope Gastrointestinal:  abdominal pain, N/V, diarrhea, dysphagia, constipation, bleeding Genitourinary:  frequency, urgency, dysuria, hematuria, incontinence Muskuloskeletal :  arthralgia, myalgia, back pain Hematology:  easy bruising, nose or gum bleeding, lymphadenopathy Dermatological: rash, ulceration, pruritis, color change / jaundice Endocrine:   hot flashes or polydipsia Neurological:  headache, dizziness, confusion, all extrimities weakness, paresthesia, 
 Speech difficulties, memory loss, gait difficulty Psychological: Feelings of anxiety, depression, agitation Objective: VITALS:   
Visit Vitals  BP (!) 154/99  Pulse (!) 102  Temp 98.4 °F (36.9 °C)  Resp 10  
 Ht 5' 5\" (1.651 m)  Wt 79.4 kg (175 lb)  SpO2 97%  BMI 29.12 kg/m2 PHYSICAL EXAM: 
 
General:    Alert, cooperative, no distress, appears stated age. HEENT: Atraumatic, anicteric sclerae, pink conjunctivae No oral ulcers, mucosa moist, throat clear, dentition fair Neck:  Supple, symmetrical,  thyroid: non tender Lungs:   Clear to auscultation bilaterally. No Wheezing or Rhonchi. No rales. Chest wall:  No tenderness  No Accessory muscle use. Heart:   Regular  rhythm,  No  murmur   No edema Abdomen:   Soft, non-tender. Not distended. Bowel sounds normal 
Extremities: No cyanosis. No clubbing,   
  Skin turgor normal, Capillary refill normal, Radial dial pulse 2+ Skin:     Not pale. Not Jaundiced  No rashes Psych:  Good insight. Not depressed. Not anxious or agitated. Neurologic: EOMs intact. No facial asymmetry. No aphasia or slurred speech. Weakness in all 4 extrimities, Sensation grossly intact. Alert and oriented X 4.  
 
_______________________________________________________________________ Care Plan discussed with: 
  Comments Patient y Family RN y   
Care Manager Consultant:  y Psychiatry as above  
_______________________________________________________________________ Expected  Disposition:  
Home with Family HH/PT/OT/RN   
SNF/LTC   
BELIA y  
________________________________________________________________________ TOTAL TIME:  60 Minutes Critical Care Provided     Minutes non procedure based Comments  
 y Reviewed previous records  
>50% of visit spent in counseling and coordination of care y Discussion with patient and family and questions answered ________________________________________________________________________ Signed: Claritza Michael MD 
 
Procedures: see electronic medical records for all procedures/Xrays and details which were not copied into this note but were reviewed prior to creation of Plan. LAB DATA REVIEWED:   
Recent Results (from the past 24 hour(s)) EKG, 12 LEAD, INITIAL Collection Time: 09/26/18 10:58 AM  
Result Value Ref Range Ventricular Rate 83 BPM  
 Atrial Rate 85 BPM  
 QRS Duration 62 ms Q-T Interval 348 ms QTC Calculation (Bezet) 408 ms Calculated R Axis -17 degrees Calculated T Axis -27 degrees Diagnosis Sinus rhythm No previous ECGs available Confirmed by Sriram Hill ([id number]) on 9/26/2018 2:39:37 PM 
  
CBC WITH AUTOMATED DIFF Collection Time: 09/26/18 11:59 AM  
Result Value Ref Range WBC 10.1 3.6 - 11.0 K/uL  
 RBC 4.41 3.80 - 5.20 M/uL  
 HGB 14.2 11.5 - 16.0 g/dL HCT 40.3 35.0 - 47.0 % MCV 91.4 80.0 - 99.0 FL  
 MCH 32.2 26.0 - 34.0 PG  
 MCHC 35.2 30.0 - 36.5 g/dL  
 RDW 12.1 11.5 - 14.5 % PLATELET 097 099 - 436 K/uL MPV 10.4 8.9 - 12.9 FL  
 NRBC 0.0 0  WBC ABSOLUTE NRBC 0.00 0.00 - 0.01 K/uL NEUTROPHILS 76 (H) 32 - 75 % LYMPHOCYTES 16 12 - 49 % MONOCYTES 7 5 - 13 % EOSINOPHILS 0 0 - 7 % BASOPHILS 0 0 - 1 % IMMATURE GRANULOCYTES 0 0.0 - 0.5 % ABS. NEUTROPHILS 7.7 1.8 - 8.0 K/UL  
 ABS. LYMPHOCYTES 1.6 0.8 - 3.5 K/UL  
 ABS. MONOCYTES 0.7 0.0 - 1.0 K/UL  
 ABS. EOSINOPHILS 0.0 0.0 - 0.4 K/UL  
 ABS. BASOPHILS 0.0 0.0 - 0.1 K/UL  
 ABS. IMM. GRANS. 0.0 0.00 - 0.04 K/UL  
 DF AUTOMATED METABOLIC PANEL, COMPREHENSIVE Collection Time: 09/26/18 11:59 AM  
Result Value Ref Range Sodium 137 136 - 145 mmol/L Potassium 3.6 3.5 - 5.1 mmol/L Chloride 99 97 - 108 mmol/L  
 CO2 26 21 - 32 mmol/L Anion gap 12 5 - 15 mmol/L Glucose 97 65 - 100 mg/dL BUN 9 6 - 20 MG/DL  Creatinine 1.06 (H) 0.55 - 1.02 MG/DL  
 BUN/Creatinine ratio 8 (L) 12 - 20 GFR est AA >60 >60 ml/min/1.73m2 GFR est non-AA 58 (L) >60 ml/min/1.73m2 Calcium 9.7 8.5 - 10.1 MG/DL Bilirubin, total 0.9 0.2 - 1.0 MG/DL  
 ALT (SGPT) 49 12 - 78 U/L  
 AST (SGOT) 48 (H) 15 - 37 U/L Alk. phosphatase 52 45 - 117 U/L Protein, total 8.1 6.4 - 8.2 g/dL Albumin 4.5 3.5 - 5.0 g/dL Globulin 3.6 2.0 - 4.0 g/dL A-G Ratio 1.3 1.1 - 2.2 MAGNESIUM Collection Time: 09/26/18 11:59 AM  
Result Value Ref Range Magnesium 2.1 1.6 - 2.4 mg/dL URINALYSIS W/ REFLEX CULTURE Collection Time: 09/26/18  2:17 PM  
Result Value Ref Range Color DARK YELLOW Appearance CLOUDY (A) CLEAR Specific gravity 1.021 1.003 - 1.030    
 pH (UA) 6.0 5.0 - 8.0 Protein TRACE (A) NEG mg/dL Glucose NEGATIVE  NEG mg/dL Ketone 40 (A) NEG mg/dL Blood NEGATIVE  NEG Urobilinogen 1.0 0.2 - 1.0 EU/dL Nitrites POSITIVE (A) NEG Leukocyte Esterase SMALL (A) NEG    
 WBC 5-10 0 - 4 /hpf  
 RBC 0-5 0 - 5 /hpf Epithelial cells MODERATE (A) FEW /lpf Bacteria 3+ (A) NEG /hpf  
 UA:UC IF INDICATED URINE CULTURE ORDERED (A) CNI Mucus TRACE (A) NEG /lpf  
 CA Oxalate crystals FEW (A) NEG    
BILIRUBIN, CONFIRM Collection Time: 09/26/18  2:17 PM  
Result Value Ref Range Bilirubin UA, confirm NEGATIVE  NEG    
HCG URINE, QL. - POC Collection Time: 09/26/18  2:53 PM  
Result Value Ref Range  Pregnancy test,urine (POC) NEGATIVE  NEG

## 2018-09-26 NOTE — PROGRESS NOTES
Pharmacy Clarification of Prior to Admission Medication Regimen The patient was interviewed regarding clarification of the prior to admission medication regimen and was questioned regarding use of any other inhalers, topical products, over the counter medications, herbal medications, vitamin products or ophthalmic/nasal/otic medication use. Information Obtained From: Patient Pertinent Pharmacy Findings: 
? Updated patients preferred outpatient pharmacy to: Patient did not want to provide an outpatient pharmacy. Patient requesting paper prescriptions ? diphenhydrAMINE (BENADRYL) 25 mg capsule: Patient stated she will take 50 mg of this agent QHS and if she wakes up in the middle of the night with tremors she will take another 50 mg. PTA medication list was corrected to the following:  
 
Prior to Admission Medications Prescriptions Last Dose Informant Patient Reported? Taking?  
aspirin delayed-release 81 mg tablet 9/25/2018 at Unknown time Self Yes Yes Sig: Take 81 mg by mouth daily as needed for Pain. diphenhydrAMINE (BENADRYL) 25 mg capsule 9/25/2018 at Unknown time Self Yes Yes Sig: Take  mg by mouth nightly. ibuprofen (MOTRIN) 200 mg tablet 9/24/2018 at Unknown time Self Yes Yes Sig: Take 200 mg by mouth every four (4) hours as needed for Pain. Facility-Administered Medications: None Thank you, 
Alvarez Fontana CPhT Medication History Pharmacy Technician

## 2018-09-26 NOTE — ED PROVIDER NOTES
EMERGENCY DEPARTMENT HISTORY AND PHYSICAL EXAM 
 
 
Date: 9/26/2018 Patient Name: Karo Watkins History of Presenting Illness Chief Complaint Patient presents with  Tremors Pt arrives via EMS. per EMS, pt has had weakness since the end of August and tremors since August.  Pt states she receives haldol shots once a month and this episode of weakness started after her most recent Haldol shot. Pt states \"my muscles are drawing up\" HPI:  Karo Watkins is a 43 y.o. female with generalized weakness and tremors x 1 month. Pt says her sx's have been progressively worsening since getting her last haldol shot approx one month ago. Pt states that she lives at home w/ her room mate. She says she is having a hard time taking care of herself at home due to weakness. She also reports 7/10, constant, non radiating right hip pain. She denies focal weakness, numbness, fever/chills, uti sx's, among other assoc sx's. PCP: PROVIDER UNKNOWN Current Facility-Administered Medications Medication Dose Route Frequency Provider Last Rate Last Dose  sodium chloride (NS) flush 5-10 mL  5-10 mL IntraVENous Q8H LOURDES Wilcox   10 mL at 09/26/18 1327  
 sodium chloride (NS) flush 5-10 mL  5-10 mL IntraVENous PRN LOURDES Wilcox      
 benztropine (COGENTIN) injection 1 mg  1 mg IntraMUSCular Q12H Manuela Jerome MD   1 mg at 09/26/18 1827  cefTRIAXone (ROCEPHIN) 1 g in 0.9% sodium chloride (MBP/ADV) 50 mL  1 g IntraVENous Q24H Bharathi Tovar  mL/hr at 09/26/18 1804 1 g at 09/26/18 1804  aspirin delayed-release tablet 81 mg  81 mg Oral DAILY PRN Manuela Jerome MD      
 sodium chloride (NS) flush 5-10 mL  5-10 mL IntraVENous Q8H Bharathi Tovar MD      
 sodium chloride (NS) flush 5-10 mL  5-10 mL IntraVENous PRN Manuela Jerome MD      
 acetaminophen (TYLENOL) tablet 650 mg  650 mg Oral Q6H PRN Manuela Jerome MD   650 mg at 09/26/18 1803  ondansetron (ZOFRAN) injection 4 mg  4 mg IntraVENous Q4H PRN Danitza Valiente MD      
 heparin (porcine) injection 5,000 Units  5,000 Units SubCUTAneous Q8H Bharathi Tovar MD      
 ibuprofen (MOTRIN) tablet 200 mg  200 mg Oral Q6H PRN Danitza Valiente MD      
 influenza vaccine 2018-19 (6 mos+)(PF) (FLUARIX QUAD/FLULAVAL QUAD) injection 0.5 mL  0.5 mL IntraMUSCular PRIOR TO DISCHARGE Danitza Valiente MD      
 
 
Past History Past Medical History: 
Past Medical History:  
Diagnosis Date  Attention and concentration deficit  Bipolar disorder ([facility] [address]  Chronic fatigue  Chronic pain  Fibromyalgia 6 years ago Dr. Sabrina Eason  Hearing voices Dr. Grace Moralez  Hx of depression headache  Post herpetic neuralgia  Schizophrenia ([facility]  Shingles 2 weeks ago Past Surgical History: 
History reviewed. No pertinent surgical history. Family History: 
Family History Problem Relation Age of Onset  Diabetes Mother  Thyroid Disease Mother  COPD Mother  Stroke Paternal Grandfather  Heart Attack Father At age 39  Bipolar Disorder Father Social History: 
Social History Substance Use Topics  Smoking status: Current Every Day Smoker Packs/day: 1.00 Years: 10.00  Smokeless tobacco: Never Used  Alcohol use None Comment: not really Allergies: 
No Known Allergies Review of Systems Review of Systems Constitutional: Negative for chills, fever and unexpected weight change. Respiratory: Negative for shortness of breath. Cardiovascular: Negative for chest pain and palpitations. Gastrointestinal: Negative for abdominal pain, nausea and vomiting. Musculoskeletal: Negative for arthralgias and myalgias. Skin: Negative for rash. Neurological: Positive for tremors and weakness. Negative for dizziness, seizures, syncope, facial asymmetry, speech difficulty, light-headedness, numbness and headaches. All other systems reviewed and are negative. Physical Exam  
 
Vitals:  
 09/26/18 1500 09/26/18 1530 09/26/18 1648 09/26/18 1800 BP:   (!) 154/99 116/74 Pulse: 83 86 (!) 102 76 Resp: 23 23 10 20 Temp:    99 °F (37.2 °C) SpO2: 99% 98% 97% 97% Weight:      
Height:      
 
Physical Exam  
Constitutional: She is oriented to person, place, and time. She appears well-developed and well-nourished. No distress. HENT:  
Head: Normocephalic and atraumatic. Cardiovascular: Normal rate, regular rhythm and normal heart sounds. Pulmonary/Chest: Effort normal and breath sounds normal.  
Abdominal: Soft. Bowel sounds are normal. She exhibits no distension and no mass. There is no tenderness. There is no rebound and no guarding. Musculoskeletal:  
Right lower extremity: Full ROM and no pain w/ passing ROM. No swelling when compared to other extremity. No swelling, erythema, signs of skin infection/rash or warmth to the touch. No obvious trauma or deformity. 2+ distal pulses, NVI, sensation grossly intact to light touch. No TTP. No pitting edema or crepitus. Pt able to bear weight. Neurological: She is alert and oriented to person, place, and time. No cranial nerve deficit. Generally weak, slow to follow commands, resting tremor Skin: Skin is warm and dry. She is not diaphoretic. Psychiatric: She has a normal mood and affect. Her behavior is normal. Judgment and thought content normal.  
 
 
 
Diagnostic Study Results Labs - Recent Results (from the past 12 hour(s)) EKG, 12 LEAD, INITIAL Collection Time: 09/26/18 10:58 AM  
Result Value Ref Range Ventricular Rate 83 BPM  
 Atrial Rate 85 BPM  
 QRS Duration 62 ms Q-T Interval 348 ms QTC Calculation (Bezet) 408 ms Calculated R Axis -17 degrees Calculated T Axis -27 degrees Diagnosis Sinus rhythm No previous ECGs available Confirmed by Ezekiel Cantor ([id number]) on 9/26/2018 2:39:37 PM 
  
 CBC WITH AUTOMATED DIFF Collection Time: 09/26/18 11:59 AM  
Result Value Ref Range WBC 10.1 3.6 - 11.0 K/uL  
 RBC 4.41 3.80 - 5.20 M/uL  
 HGB 14.2 11.5 - 16.0 g/dL HCT 40.3 35.0 - 47.0 % MCV 91.4 80.0 - 99.0 FL  
 MCH 32.2 26.0 - 34.0 PG  
 MCHC 35.2 30.0 - 36.5 g/dL  
 RDW 12.1 11.5 - 14.5 % PLATELET 362 585 - 996 K/uL MPV 10.4 8.9 - 12.9 FL  
 NRBC 0.0 0  WBC ABSOLUTE NRBC 0.00 0.00 - 0.01 K/uL NEUTROPHILS 76 (H) 32 - 75 % LYMPHOCYTES 16 12 - 49 % MONOCYTES 7 5 - 13 % EOSINOPHILS 0 0 - 7 % BASOPHILS 0 0 - 1 % IMMATURE GRANULOCYTES 0 0.0 - 0.5 % ABS. NEUTROPHILS 7.7 1.8 - 8.0 K/UL  
 ABS. LYMPHOCYTES 1.6 0.8 - 3.5 K/UL  
 ABS. MONOCYTES 0.7 0.0 - 1.0 K/UL  
 ABS. EOSINOPHILS 0.0 0.0 - 0.4 K/UL  
 ABS. BASOPHILS 0.0 0.0 - 0.1 K/UL  
 ABS. IMM. GRANS. 0.0 0.00 - 0.04 K/UL  
 DF AUTOMATED METABOLIC PANEL, COMPREHENSIVE Collection Time: 09/26/18 11:59 AM  
Result Value Ref Range Sodium 137 136 - 145 mmol/L Potassium 3.6 3.5 - 5.1 mmol/L Chloride 99 97 - 108 mmol/L  
 CO2 26 21 - 32 mmol/L Anion gap 12 5 - 15 mmol/L Glucose 97 65 - 100 mg/dL BUN 9 6 - 20 MG/DL Creatinine 1.06 (H) 0.55 - 1.02 MG/DL  
 BUN/Creatinine ratio 8 (L) 12 - 20 GFR est AA >60 >60 ml/min/1.73m2 GFR est non-AA 58 (L) >60 ml/min/1.73m2 Calcium 9.7 8.5 - 10.1 MG/DL Bilirubin, total 0.9 0.2 - 1.0 MG/DL  
 ALT (SGPT) 49 12 - 78 U/L  
 AST (SGOT) 48 (H) 15 - 37 U/L Alk. phosphatase 52 45 - 117 U/L Protein, total 8.1 6.4 - 8.2 g/dL Albumin 4.5 3.5 - 5.0 g/dL Globulin 3.6 2.0 - 4.0 g/dL A-G Ratio 1.3 1.1 - 2.2 MAGNESIUM Collection Time: 09/26/18 11:59 AM  
Result Value Ref Range Magnesium 2.1 1.6 - 2.4 mg/dL URINALYSIS W/ REFLEX CULTURE Collection Time: 09/26/18  2:17 PM  
Result Value Ref Range Color DARK YELLOW Appearance CLOUDY (A) CLEAR Specific gravity 1.021 1.003 - 1.030    
 pH (UA) 6.0 5.0 - 8.0 Protein TRACE (A) NEG mg/dL Glucose NEGATIVE  NEG mg/dL Ketone 40 (A) NEG mg/dL Blood NEGATIVE  NEG Urobilinogen 1.0 0.2 - 1.0 EU/dL Nitrites POSITIVE (A) NEG Leukocyte Esterase SMALL (A) NEG    
 WBC 5-10 0 - 4 /hpf  
 RBC 0-5 0 - 5 /hpf Epithelial cells MODERATE (A) FEW /lpf Bacteria 3+ (A) NEG /hpf  
 UA:UC IF INDICATED URINE CULTURE ORDERED (A) CNI Mucus TRACE (A) NEG /lpf  
 CA Oxalate crystals FEW (A) NEG    
BILIRUBIN, CONFIRM Collection Time: 09/26/18  2:17 PM  
Result Value Ref Range Bilirubin UA, confirm NEGATIVE  NEG    
HCG URINE, QL. - POC Collection Time: 09/26/18  2:53 PM  
Result Value Ref Range Pregnancy test,urine (POC) NEGATIVE  NEG Radiologic Studies - No orders to display CT Results  (Last 48 hours) None CXR Results  (Last 48 hours) None Medical Decision Making I am the first provider for this patient. I reviewed the vital signs, available nursing notes, past medical history, past surgical history, family history and social history. Vital Signs-Reviewed the patient's vital signs. Pulse Oximetry Analysis - 96% on RA Cardiac Monitor: 
Rate: 88 Rhythm: sinus EKG: Interpreted by the EP. Dr. Veronique Rosado Time Interpreted: 10:58 Rate: 83 Rhythm: normal sinus w/o ST T wave changes Interpretation: NSR w/o ST T wave changes Records Reviewed: Nursing Notes and Old Medical Records ED Course:  
Initial assessment performed. The patients presenting problems have been discussed, and they are in agreement with the care plan formulated and outlined with them. I have encouraged them to ask questions as they arise throughout their visit. Available labs, imaging, and vital signs reviewed and read in full detail Vitals:  
 09/26/18 1500 09/26/18 1530 09/26/18 1648 09/26/18 1800 BP:   (!) 154/99 116/74 BP 1 Location:    Left arm BP Patient Position:    At rest  
Pulse: 83 86 (!) 102 76 Resp: 23 23 10 20 Temp:    99 °F (37.2 °C) SpO2: 99% 98% 97% 97% Weight:      
Height:      
 
 
ED CONSULT NOTE:  
Nahed Chavez PA-C spoke with Dr. Kaylee Sabillon, ED Physician Discussed pt's hx, disposition, and available diagnostic and imaging results. Reviewed care plans. Consultant agrees with plans as outlined. ADMIT NOTE: The patient is being admitted to the hospitalist service by Dr. Jeyson De. The results of their tests and reasons for their admission have been discussed with the patient and/or available family. They convey agreement and understanding for the need to be admitted and for their admission diagnosis. Disposition: 
admitted Follow-up Information Follow up With Details Comments Contact Info [employer] Medicaid Transportation   CALL THIS NUMBER 3 DAYS PRIOR TO APPOINTMENTS FOR TRANSPORTATION TO AND FROM APPOINTMENTS [phone] [facility] EMERGENCY DEPT Go to If symptoms worsen [address] [facility] 
[phone] Current Discharge Medication List  
  
START taking these medications Details  
cephALEXin (KEFLEX) 500 mg capsule Take 1 Cap by mouth two (2) times a day for 7 days. Qty: 14 Cap, Refills: 0  
  
ketorolac (TORADOL) 10 mg tablet Take 1 Tab by mouth every six (6) hours as needed for Pain. Qty: 15 Tab, Refills: 0 Provider Notes (Medical Decision Making): Both case management and PT saw and assessed the pt and gave her appropriate information and f/u information. Case management reported pt did not qualify for home health because she does not have a PCP. Pt advised she has had several episodes of waking up on the floor at her home, has been so weak that she has been unable to care for herself, and does not feel safe for discharge home. Procedures: 
Procedures Diagnosis Clinical Impression:  
1. Weakness 2. Urinary tract infection without hematuria, site unspecified 3. Right hip pain _____________________________

## 2018-09-26 NOTE — PROGRESS NOTES
Date of previous inpatient admission/ ED visit? Pt was last seen in the ED at [facility] on 9/12/18 with cc of weakness and slurred speech What brought the patient back to ED? Pt presented to the ED via EMS with cc of tremors Did patient decline recommended services during last admission/ ED visit (if yes, what)? N/A Has patient seen a provider since their last inpatient admission/ED visit (if yes, when)? No 
 
CM Interventions: 
From previous inpatient admission/ED visit: Pt was evaluated at a hospital outside the [facility] system From current inpatient admission/ED visit: CM consulted re: pt's care needs. CM met with pt, introduced self, role. Pt verbalized understanding. Pt verified demographic information on chart. Pt lives with a roommate in a 1 story home. Pt stated she requires assistance with ADL/IADL needs at this time. Pt was previously able to complete daily needs, but stated today she was unable to complete ADL/IADL needs due to tremors. Pt does have diagnoses of bipolar and schizophrenia, but is currently not following with a service for her mental health needs. Pt stated she was to follow up with her local [facility], but has not done so at this time. CM encouraged pt to follow up with her local [facility] for continued services. Pt verbalized understanding. CM offered information re: personal care aides to assist with ADL/IADL needs. Pt verbalized understanding, but stated \"I was hoping to get admitted. \" CM offered a disposition decision has not been made at this time. Pt stated she has no further questions or needs at this time. CM will continue to remain available for support, discharge planning as needed. 15:16 CM consulted re: home health. CM met with pt re: home health options. Pt stated she did not have preference for home health at this time.  CM called [facility] re: referral. Per [facility], pt will need to be followed by a PCP for home health orders. Pt does not have a PCP at this time. CM informed LOURDES Price re: inability to obtain home health services. CM informed pt of need of PCP for home health services. Pt verbalized understanding. CM offered pt a list of personal care aides to assist with ADL/IADL needs. Pt accepted list. Pt stated she has no further questions or needs at this time. CM will continue to remain available for support, discharge planning as needed. Care Management Interventions PCP Verified by CM: Yes Mode of Transport at Discharge: Self Transition of Care Consult (CM Consult): Discharge Planning Discharge Durable Medical Equipment: No 
Physical Therapy Consult: Yes Occupational Therapy Consult: No 
Current Support Network: Own Home Confirm Follow Up Transport: Self Plan discussed with Pt/Family/Caregiver: Yes Discharge Location Discharge Placement: Home [place] [facility] [phone]

## 2018-09-27 PROBLEM — G89.29 CHRONIC PAIN: Chronic | Status: ACTIVE | Noted: 2018-09-26

## 2018-09-27 PROBLEM — F99 PSYCHIATRIC ILLNESS: Chronic | Status: ACTIVE | Noted: 2018-09-26

## 2018-09-27 LAB
ALBUMIN SERPL-MCNC: 3.8 G/DL (ref 3.5–5)
ALBUMIN/GLOB SERPL: 1.2 {RATIO} (ref 1.1–2.2)
ALP SERPL-CCNC: 47 U/L (ref 45–117)
ALT SERPL-CCNC: 45 U/L (ref 12–78)
ANION GAP SERPL CALC-SCNC: 9 MMOL/L (ref 5–15)
AST SERPL-CCNC: 70 U/L (ref 15–37)
BASOPHILS # BLD: 0.1 K/UL (ref 0–0.1)
BASOPHILS NFR BLD: 1 % (ref 0–1)
BILIRUB SERPL-MCNC: 0.8 MG/DL (ref 0.2–1)
BUN SERPL-MCNC: 10 MG/DL (ref 6–20)
BUN/CREAT SERPL: 10 (ref 12–20)
CALCIUM SERPL-MCNC: 8.9 MG/DL (ref 8.5–10.1)
CHLORIDE SERPL-SCNC: 103 MMOL/L (ref 97–108)
CO2 SERPL-SCNC: 25 MMOL/L (ref 21–32)
CREAT SERPL-MCNC: 1 MG/DL (ref 0.55–1.02)
DIFFERENTIAL METHOD BLD: ABNORMAL
EOSINOPHIL # BLD: 0.3 K/UL (ref 0–0.4)
EOSINOPHIL NFR BLD: 4 % (ref 0–7)
ERYTHROCYTE [DISTWIDTH] IN BLOOD BY AUTOMATED COUNT: 12.1 % (ref 11.5–14.5)
GLOBULIN SER CALC-MCNC: 3.1 G/DL (ref 2–4)
GLUCOSE SERPL-MCNC: 94 MG/DL (ref 65–100)
HCT VFR BLD AUTO: 38.1 % (ref 35–47)
HGB BLD-MCNC: 12.9 G/DL (ref 11.5–16)
IMM GRANULOCYTES # BLD: 0 K/UL (ref 0–0.04)
IMM GRANULOCYTES NFR BLD AUTO: 0 % (ref 0–0.5)
LYMPHOCYTES # BLD: 3.7 K/UL (ref 0.8–3.5)
LYMPHOCYTES NFR BLD: 44 % (ref 12–49)
MCH RBC QN AUTO: 31.8 PG (ref 26–34)
MCHC RBC AUTO-ENTMCNC: 33.9 G/DL (ref 30–36.5)
MCV RBC AUTO: 93.8 FL (ref 80–99)
MONOCYTES # BLD: 0.8 K/UL (ref 0–1)
MONOCYTES NFR BLD: 9 % (ref 5–13)
NEUTS SEG # BLD: 3.6 K/UL (ref 1.8–8)
NEUTS SEG NFR BLD: 42 % (ref 32–75)
NRBC # BLD: 0 K/UL (ref 0–0.01)
NRBC BLD-RTO: 0 PER 100 WBC
PLATELET # BLD AUTO: 235 K/UL (ref 150–400)
PMV BLD AUTO: 10.7 FL (ref 8.9–12.9)
POTASSIUM SERPL-SCNC: 3.5 MMOL/L (ref 3.5–5.1)
PROT SERPL-MCNC: 6.9 G/DL (ref 6.4–8.2)
RBC # BLD AUTO: 4.06 M/UL (ref 3.8–5.2)
SODIUM SERPL-SCNC: 137 MMOL/L (ref 136–145)
WBC # BLD AUTO: 8.4 K/UL (ref 3.6–11)

## 2018-09-27 PROCEDURE — 65660000000 HC RM CCU STEPDOWN

## 2018-09-27 PROCEDURE — 74011250636 HC RX REV CODE- 250/636: Performed by: INTERNAL MEDICINE

## 2018-09-27 PROCEDURE — 74011000258 HC RX REV CODE- 258: Performed by: INTERNAL MEDICINE

## 2018-09-27 PROCEDURE — 74011250637 HC RX REV CODE- 250/637: Performed by: EMERGENCY MEDICINE

## 2018-09-27 PROCEDURE — 85025 COMPLETE CBC W/AUTO DIFF WBC: CPT | Performed by: INTERNAL MEDICINE

## 2018-09-27 PROCEDURE — 92610 EVALUATE SWALLOWING FUNCTION: CPT

## 2018-09-27 PROCEDURE — 74011250637 HC RX REV CODE- 250/637: Performed by: INTERNAL MEDICINE

## 2018-09-27 PROCEDURE — 80053 COMPREHEN METABOLIC PANEL: CPT | Performed by: INTERNAL MEDICINE

## 2018-09-27 PROCEDURE — 97166 OT EVAL MOD COMPLEX 45 MIN: CPT

## 2018-09-27 PROCEDURE — 74011250637 HC RX REV CODE- 250/637: Performed by: PSYCHIATRY & NEUROLOGY

## 2018-09-27 PROCEDURE — 97535 SELF CARE MNGMENT TRAINING: CPT

## 2018-09-27 PROCEDURE — 97116 GAIT TRAINING THERAPY: CPT

## 2018-09-27 PROCEDURE — 36415 COLL VENOUS BLD VENIPUNCTURE: CPT | Performed by: INTERNAL MEDICINE

## 2018-09-27 RX ORDER — KETOROLAC TROMETHAMINE 30 MG/ML
15 INJECTION, SOLUTION INTRAMUSCULAR; INTRAVENOUS
Status: COMPLETED | OUTPATIENT
Start: 2018-09-27 | End: 2018-09-27

## 2018-09-27 RX ORDER — BENZTROPINE MESYLATE 1 MG/1
1 TABLET ORAL 2 TIMES DAILY
Status: DISCONTINUED | OUTPATIENT
Start: 2018-09-28 | End: 2018-09-29 | Stop reason: HOSPADM

## 2018-09-27 RX ORDER — ZOLPIDEM TARTRATE 5 MG/1
5 TABLET ORAL
Status: DISCONTINUED | OUTPATIENT
Start: 2018-09-27 | End: 2018-09-29 | Stop reason: HOSPADM

## 2018-09-27 RX ORDER — BENZTROPINE MESYLATE 1 MG/1
2 TABLET ORAL
Status: COMPLETED | OUTPATIENT
Start: 2018-09-27 | End: 2018-09-27

## 2018-09-27 RX ORDER — KETOROLAC TROMETHAMINE 30 MG/ML
15 INJECTION, SOLUTION INTRAMUSCULAR; INTRAVENOUS
Status: DISCONTINUED | OUTPATIENT
Start: 2018-09-27 | End: 2018-09-29 | Stop reason: HOSPADM

## 2018-09-27 RX ADMIN — BENZTROPINE MESYLATE 1 MG: 1 INJECTION, SOLUTION INTRAMUSCULAR; INTRAVENOUS at 06:31

## 2018-09-27 RX ADMIN — HEPARIN SODIUM 5000 UNITS: 5000 INJECTION INTRAVENOUS; SUBCUTANEOUS at 21:30

## 2018-09-27 RX ADMIN — HEPARIN SODIUM 5000 UNITS: 5000 INJECTION INTRAVENOUS; SUBCUTANEOUS at 05:57

## 2018-09-27 RX ADMIN — BENZTROPINE MESYLATE 2 MG: 1 TABLET ORAL at 19:31

## 2018-09-27 RX ADMIN — Medication 10 ML: at 13:17

## 2018-09-27 RX ADMIN — HEPARIN SODIUM 5000 UNITS: 5000 INJECTION INTRAVENOUS; SUBCUTANEOUS at 13:17

## 2018-09-27 RX ADMIN — Medication 10 ML: at 21:30

## 2018-09-27 RX ADMIN — Medication 10 ML: at 05:57

## 2018-09-27 RX ADMIN — CEFTRIAXONE 1 G: 1 INJECTION, POWDER, FOR SOLUTION INTRAMUSCULAR; INTRAVENOUS at 17:04

## 2018-09-27 RX ADMIN — IBUPROFEN 200 MG: 200 TABLET, FILM COATED ORAL at 20:08

## 2018-09-27 RX ADMIN — ZOLPIDEM TARTRATE 5 MG: 5 TABLET ORAL at 21:29

## 2018-09-27 RX ADMIN — KETOROLAC TROMETHAMINE 15 MG: 30 INJECTION, SOLUTION INTRAMUSCULAR at 16:57

## 2018-09-27 RX ADMIN — Medication 10 ML: at 13:16

## 2018-09-27 NOTE — CONSULTS
Psychiatry Consult Note    Subjective:   Patient:  Soy Mckeon Age:  43 y.o. :  1980  Date of Evaluation:  2018    Reason for Referral:  Soy Mckeon was admitted for dystonia and psych consulted for med review    History of Presenting Problem: pt seen today. She reports she was admitted to [facility] for 3 months with psychiatric c/o ( she will not disclose the true reason except stating she had misdemeanor charges) on the forensic unit and started on Haldol decanoate which she already has received 3 doses in the past 3 months. She was due to receive her monthly shot on  which she missed stating she has no follow up in the community. She has been c/o stiffness and muscle pain which has been ongoing and blames it on her depot Haldol. Her affect is flat, she is guarded and does not want to discuss her mental health issue. She denies active SI/HI/ and preoccupied with her pain and physical c/o. She is paranoid but denies hallucination. Denies drug or alcohol abuse but pt is a poor historian.        Social History:   Social History     Social History    Marital status: SINGLE     Spouse name: N/A    Number of children: N/A    Years of education: N/A     Social History Main Topics    Smoking status: Current Every Day Smoker     Packs/day: 1.00     Years: 10.00    Smokeless tobacco: Never Used    Alcohol use None      Comment: not really    Drug use: No    Sexual activity: No     Other Topics Concern    None     Social History Narrative      Has 2 children       Legal: misdemeanor charge unknown and was at forensic unit [facility] ( she made a remark about ?possession of paraphernalia)    Family History:     Family History   Problem Relation Age of Onset    Diabetes Mother     Thyroid Disease Mother     COPD Mother     Stroke Paternal Grandfather     Heart Attack Father      At age 39   [facility] Bipolar Disorder Father          Medical History:   Past Medical History: Diagnosis Date    Attention and concentration deficit     Bipolar disorder (HCC)     Chronic fatigue     Chronic pain     Fibromyalgia 6 years ago    Dr. Jj Alas voices     Dr. Logan Zelaya Hx of depression headache     Post herpetic neuralgia     Schizophrenia ([address]     Shingles 2 weeks ago       Psychiatric History: schizophrenia and mood disorder starting at age 14 per pt with admission to [facility] and [address] [place] [address] and [facility] in [place].  She refuses to answer question about medication trial      Substance Use History:   History   Alcohol use Not on file     Comment: not really     History   Drug Use No           Objective:     Vitals/Physical Assessment:        Patient Vitals for the past 8 hrs:   BP Temp Pulse Resp SpO2   09/27/18 1455 112/75 98.3 °F (36.8 °C) 65 18 98 %           MENTAL STATUS EXAM:   FINDINGS WITHIN NORMAL LIMITS (WNL) UNLESS OTHERWISE STATED BELOW:    Sensorium  oriented to time, place and person   Relations guarded, passive and unreliable   Appearance:  age appropriate and disheveled   Motor Behavior:  bradykinesis   Speech:  hypoverbal and monotone   Thought Process: goal directed   Thought Content delusions, free of hallucinations and preoccupations   Suicidal ideations none   Homicidal ideations none   Mood:  flat   Affect:  blunted, increased in intensity and mood-congruent   Memory recent  adequate   Memory remote:  adequate   Concentration:  impaired   Abstraction:  abstract   Insight:  limited   Reliability poor   Judgment:  poor          Pertinant Data:  Patient Vitals for the past 8 hrs:   Temp Pulse Resp BP SpO2   09/27/18 1455 98.3 °F (36.8 °C) 65 18 112/75 98 %       Recent Results (from the past 24 hour(s))   METABOLIC PANEL, COMPREHENSIVE    Collection Time: 09/27/18  3:49 AM   Result Value Ref Range    Sodium 137 136 - 145 mmol/L    Potassium 3.5 3.5 - 5.1 mmol/L    Chloride 103 97 - 108 mmol/L    CO2 25 21 - 32 mmol/L    Anion gap 9 5 - 15 mmol/L    Glucose 94 65 - 100 mg/dL    BUN 10 6 - 20 MG/DL    Creatinine 1.00 0.55 - 1.02 MG/DL    BUN/Creatinine ratio 10 (L) 12 - 20      GFR est AA >60 >60 ml/min/1.73m2    GFR est non-AA >60 >60 ml/min/1.73m2    Calcium 8.9 8.5 - 10.1 MG/DL    Bilirubin, total 0.8 0.2 - 1.0 MG/DL    ALT (SGPT) 45 12 - 78 U/L    AST (SGOT) 70 (H) 15 - 37 U/L    Alk. phosphatase 47 45 - 117 U/L    Protein, total 6.9 6.4 - 8.2 g/dL    Albumin 3.8 3.5 - 5.0 g/dL    Globulin 3.1 2.0 - 4.0 g/dL    A-G Ratio 1.2 1.1 - 2.2     CBC WITH AUTOMATED DIFF    Collection Time: 09/27/18  3:49 AM   Result Value Ref Range    WBC 8.4 3.6 - 11.0 K/uL    RBC 4.06 3.80 - 5.20 M/uL    HGB 12.9 11.5 - 16.0 g/dL    HCT 38.1 35.0 - 47.0 %    MCV 93.8 80.0 - 99.0 FL    MCH 31.8 26.0 - 34.0 PG    MCHC 33.9 30.0 - 36.5 g/dL    RDW 12.1 11.5 - 14.5 %    PLATELET 867 715 - 911 K/uL    MPV 10.7 8.9 - 12.9 FL    NRBC 0.0 0  WBC    ABSOLUTE NRBC 0.00 0.00 - 0.01 K/uL    NEUTROPHILS 42 32 - 75 %    LYMPHOCYTES 44 12 - 49 %    MONOCYTES 9 5 - 13 %    EOSINOPHILS 4 0 - 7 %    BASOPHILS 1 0 - 1 %    IMMATURE GRANULOCYTES 0 0.0 - 0.5 %    ABS. NEUTROPHILS 3.6 1.8 - 8.0 K/UL    ABS. LYMPHOCYTES 3.7 (H) 0.8 - 3.5 K/UL    ABS. MONOCYTES 0.8 0.0 - 1.0 K/UL    ABS. EOSINOPHILS 0.3 0.0 - 0.4 K/UL    ABS. BASOPHILS 0.1 0.0 - 0.1 K/UL    ABS. IMM. GRANS. 0.0 0.00 - 0.04 K/UL    DF AUTOMATED       No results found.           Impression:      Active Problems:    Fibromyalgia (7/12/2012)      Dystonia (9/26/2018)      UTI (urinary tract infection) (9/26/2018)      Chronic pain (9/26/2018)      Psychiatric illness (9/26/2018)        Schizophrenia paranoid type  Extrapyramidal symptoms    Plan:       Medications:  Current Facility-Administered Medications   Medication Dose Route Frequency    [START ON 9/28/2018] benztropine (COGENTIN) tablet 1 mg  1 mg Oral BID    sodium chloride (NS) flush 5-10 mL  5-10 mL IntraVENous Q8H    sodium chloride (NS) flush 5-10 mL 5-10 mL IntraVENous PRN    cefTRIAXone (ROCEPHIN) 1 g in 0.9% sodium chloride (MBP/ADV) 50 mL  1 g IntraVENous Q24H    aspirin delayed-release tablet 81 mg  81 mg Oral DAILY PRN    sodium chloride (NS) flush 5-10 mL  5-10 mL IntraVENous Q8H    sodium chloride (NS) flush 5-10 mL  5-10 mL IntraVENous PRN    acetaminophen (TYLENOL) tablet 650 mg  650 mg Oral Q6H PRN    ondansetron (ZOFRAN) injection 4 mg  4 mg IntraVENous Q4H PRN    heparin (porcine) injection 5,000 Units  5,000 Units SubCUTAneous Q8H    ibuprofen (MOTRIN) tablet 200 mg  200 mg Oral Q6H PRN    influenza vaccine 2018-19 (6 mos+)(PF) (FLUARIX QUAD/FLULAVAL QUAD) injection 0.5 mL  0.5 mL IntraMUSCular PRIOR TO DISCHARGE        Scheduled Medications:   Current Facility-Administered Medications   Medication Dose Route Frequency    [START ON 9/28/2018] benztropine (COGENTIN) tablet 1 mg  1 mg Oral BID    sodium chloride (NS) flush 5-10 mL  5-10 mL IntraVENous Q8H    cefTRIAXone (ROCEPHIN) 1 g in 0.9% sodium chloride (MBP/ADV) 50 mL  1 g IntraVENous Q24H    sodium chloride (NS) flush 5-10 mL  5-10 mL IntraVENous Q8H    heparin (porcine) injection 5,000 Units  5,000 Units SubCUTAneous Q8H    influenza vaccine 2018-19 (6 mos+)(PF) (FLUARIX QUAD/FLULAVAL QUAD) injection 0.5 mL  0.5 mL IntraMUSCular PRIOR TO DISCHARGE            Interventions:  As per tx team        Recommendations for Treatment/Conditions: 1. Start Cogentin 1 mg bid and give stat dose of Cogentin 2 mg for EPS  2. Missed her Haldol depot last week and has residual psychotic sx- denies SI/HI. She is not willing to be admitted to IP psych  3. Check CK level. Prolactin in the meantime    Referral To: Need recent record from [facility]- she was under Dr Abebe Mae care there.   Consider crisis evaluation if pt tries to leave- unable to care for self. will need IP psych for further stabilization and medication management- no follow up identified and high likelihood that her sx will worsen .         Tiny Riojas MD  9/27/2018 7:20 PM

## 2018-09-27 NOTE — INTERDISCIPLINARY ROUNDS
[facility] Telemetry Unit Interdisciplinary rounds were held today to discuss patient plan of care and outcomes. The interdisciplinary team collaborated to facilitate discharge planning needs. The following members were present; Nurse Manager, [address], Pharmacist, Primary Nurse, , Physical Therapy, Physician, and Case Management. PLAN: 
Psychiatrist to eval, PT/OT/SLP to eval 
Probable DC 1-2 days

## 2018-09-27 NOTE — PROGRESS NOTES
Problem: Self Care Deficits Care Plan (Adult) Goal: *Acute Goals and Plan of Care (Insert Text) Occupational Therapy Goals Initiated 9/27/2018 1. Patient will perform grooming standing at sink with independence within 7 day(s). 2.  Patient will perform upper body dressing and lower body dressing with independence within 7 day(s). 3.  Patient will perform bathing with independence within 7 day(s). 4.  Patient will perform toilet transfers in bathroom with independence within 7 day(s). 5.  Patient will perform all aspects of toileting with independence within 7 day(s). Occupational Therapy EVALUATION Patient: Loretta Peterson (1 y.o. female) Date: 9/27/2018 Primary Diagnosis: Dystonia Precautions:   Bed Alarm, Fall ASSESSMENT : 
Based on the objective data described below, the patient presents with anxiety, impaired standing balance, full body tremors (LEs > UEs) for past month, self limiting behaviors and decline in functional status for ADL and IADL tasks. At baseline, pt lives with roommate, reports increased difficulty with her self care, impaired balance resulting in falls and inability to go to MD appts (doesn't drive). This date, pt received on [facility], reported unable to wipe herself due to ROM deficits. However, pt able to demonstrate functional shoulder IR and with encouragement, performed bladder hygiene in standing. Due to inconsistent behaviors, pt required constant CGA to min A x 1 for balance and safety for mobility and standing ADL tasks. Pt can be self limiting at times and requires encouragement to attempt ADLs independently. Noted tremors dissipated when distracted, allowing pt to functionally brush hair,  phone, perform hygiene and manage clothing over feet. When pt was informed of dissipation of UE tremors, pt then began shaking UEs. Pt demonstrated 5/5 B UE strength, no dysmetria during coordination with both vision and vision occluded. Currently, pt is needing CGA to min A for mobility, CGA to min A for ADLs. Recommend HHOT and PT pending progress. Recommend psychiatry consult. Patient will benefit from skilled intervention to address the above impairments. Patients rehabilitation potential is considered to be Good Factors which may influence rehabilitation potential include:  
[]             None noted [x]             Mental ability/status []             Medical condition []             Home/family situation and support systems []             Safety awareness []             Pain tolerance/management 
[]             Other: PLAN : 
Recommendations and Planned Interventions: 
[x]               Self Care Training                  [x]        Therapeutic Activities [x]               Functional Mobility Training    []        Cognitive Retraining 
[]               Therapeutic Exercises           []        Endurance Activities [x]               Balance Training                   []        Neuromuscular Re-Education []               Visual/Perceptual Training     [x]   Home Safety Training 
[x]               Patient Education                 []        Family Training/Education []               Other (comment): Frequency/Duration: Patient will be followed by occupational therapy 4 times a week to address goals. Discharge Recommendations: [employer] and PT Further Equipment Recommendations for Discharge: shower chair for balance and safety SUBJECTIVE:  
Patient stated I need you to wipe me. I can't do it because of my range of motion.  (pt then demonstrated functional ROM of UEs, performed hygiene in standing with CGA, managed clothing) OBJECTIVE DATA SUMMARY:  
HISTORY:  
Past Medical History:  
Diagnosis Date  Attention and concentration deficit  Bipolar disorder ([facility] [address]  Chronic fatigue  Chronic pain  Fibromyalgia 6 years ago Dr. Jesse Gonsalves  Hearing voices Dr. Dimitri Leone  Hx of depression headache  Post herpetic neuralgia  Schizophrenia ([facility] [address]  Shingles 2 weeks ago History reviewed. No pertinent surgical history. Prior Level of Function/Environment/Context: At baseline, pt lives with roommate, reports increased difficulty with her self care, impaired balance resulting in falls and inability to go to MD appts (doesn't drive). Occupations in which the patient is/was successful, what are the barriers preventing that success:  
Performance Patterns (routines, roles, habits, and rituals):  
Personal Interests and/or values:  
Expanded or extensive additional review of patient history: bipolar, schizophrenia, fibromyalgia Home Situation Home Environment: Private residence # Steps to Enter: 3 Rails to Enter: Yes One/Two Story Residence: One story Living Alone: No 
Support Systems: Family member(s) Patient Expects to be Discharged to[de-identified] Private residence Current DME Used/Available at Home: None Hand dominance: Right EXAMINATION OF PERFORMANCE DEFICITS: 
Cognitive/Behavioral Status: 
Neurologic State: Alert; Appropriate for age (flat affect) Orientation Level: Oriented X4 Cognition: Follows commands; Appropriate for age attention/concentration Perception: Appears intact Perseveration: No perseveration noted Safety/Judgement: Awareness of environment; Insight into deficits Skin: intact Edema: none noted Hearing: Auditory Auditory Impairment: None Vision/Perceptual:   
Tracking: Able to track stimulus in all quadrants w/o difficulty Acuity: Within Defined Limits Corrective Lenses: Glasses Range of Motion: 
 
AROM: Within functional limits Strength: 
 
Strength: Within functional limits (B UE shoulders to digits) Coordination: 
Coordination: Within functional limits (no dysmetria with vision and vision occluded) Fine Motor Skills-Upper: Left Intact; Right Intact (occasional intention tremors) Gross Motor Skills-Upper: Left Intact; Right Intact Tone & Sensation: 
 
Tone: Normal 
Sensation: Intact (denied NT) Balance: 
Sitting: Intact Standing: Impaired; With support Standing - Static: Constant support; Fair 
Standing - Dynamic : Fair (to poor while donning underwear, B LE shaking) Functional Mobility and Transfers for ADLs: 
Bed Mobility: 
  
 
Transfers: 
Sit to Stand: Contact guard assistance;Assist x1;Additional time Stand to Sit: Contact guard assistance;Assist x1;Additional time Bed to Chair: Contact guard assistance;Assist x1;Additional time (to occasional min A due to full body tremors) Toilet Transfer : Contact guard assistance;Assist x1;Additional time (to stand from [facility]) ADL Assessment: 
Feeding: Independent Oral Facial Hygiene/Grooming: Minimum assistance (brushing hair, pt self limiting behaviors) Bathing: Minimum assistance (decreased dynamic standing balnace) Upper Body Dressing: Supervision Lower Body Dressing: Minimum assistance (in standing to pull up, increased BLE tremors) Toileting: Contact guard assistance (for balance, performed own hygiene in standing) ADL Intervention and task modifications: 
  
 
  
 
  
 
  
 
  
 
  
 
  
 
Cognitive Retraining Safety/Judgement: Awareness of environment; Insight into deficits Functional Measure: 
Barthel Index: 
 
Bathin Bladder: 10 Bowels: 10 
Groomin Dressin Feeding: 10 Mobility: 10 Stairs: 5 Toilet Use: 5 Transfer (Bed to Chair and Back): 10 Total: 65 Barthel and G-code impairment scale: 
Percentage of impairment CH 
0% CI 
1-19% CJ 
20-39% CK 
40-59% CL 
60-79% CM 
80-99% CN 
100% Barthel Score 0-100 100 99-80 79-60 59-40 20-39 1-19 
 0 Barthel Score 0-20 20 17-19 13-16 9-12 5-8 1-4 0 The Barthel ADL Index: Guidelines 1. The index should be used as a record of what a patient does, not as a record of what a patient could do. 2. The main aim is to establish degree of independence from any help, physical or verbal, however minor and for whatever reason. 3. The need for supervision renders the patient not independent. 4. A patient's performance should be established using the best available evidence. Asking the patient, friends/relatives and nurses are the usual sources, but direct observation and common sense are also important. However direct testing is not needed. 5. Usually the patient's performance over the preceding 24-48 hours is important, but occasionally longer periods will be relevant. 6. Middle categories imply that the patient supplies over 50 per cent of the effort. 7. Use of aids to be independent is allowed. Charles Hawk., Barthel, DAndreasW. (9088). Functional evaluation: the Barthel Index. [facility] (14)2. Christopher Esquivel selena CHARLES Parish, Samuel Quevedo., Paty He., [place], [address] [place] [address] (1999). Measuring the change indisability after inpatient rehabilitation; comparison of the responsiveness of the Barthel Index and Functional Griggs Measure. Journal of Neurology, Neurosurgery, and Psychiatry, 66(4), 053-107. Mary Quiroz, N.J.A, SERGIO Perkins.ESME, & Terrell Sanford, M.A. (2004.) Assessment of post-stroke quality of life in cost-effectiveness studies: The usefulness of the Barthel Index and the EuroQoL-5D. [facility], 13, 169-48 G codes: In compliance with CMSs Claims Based Outcome Reporting, the following G-code set was chosen for this patient based on their primary functional limitation being treated: The outcome measure chosen to determine the severity of the functional limitation was the barthel index with a score of 65/100 which was correlated with the impairment scale. ? Self Care:  
  - CURRENT STATUS: CJ - 20%-39% impaired, limited or restricted  - GOAL STATUS: CI - 1%-19% impaired, limited or restricted  - D/C STATUS:  ---------------To be determined--------------- Occupational Therapy Evaluation Charge Determination History Examination Decision-Making MEDIUM Complexity : Expanded review of history including physical, cognitive and psychosocial  history  LOW Complexity : 1-3 performance deficits relating to physical, cognitive , or psychosocial skils that result in activity limitations and / or participation restrictions  MEDIUM Complexity : Patient may present with comorbidities that affect occupational performnce. Miniml to moderate modification of tasks or assistance (eg, physical or verbal ) with assesment(s) is necessary to enable patient to complete evaluation Based on the above components, the patient evaluation is determined to be of the following complexity level: LOW Pain: 
  
  
  
  
  
  
Activity Tolerance: VSS- 122/90 in chair, 98% O2 on RA Please refer to the flowsheet for vital signs taken during this treatment. After treatment:  
[x] Patient left in no apparent distress sitting up in chair 
[] Patient left in no apparent distress in bed 
[x] Call bell left within reach [x] Nursing notified 
[] Caregiver present [x] Bed alarm activated COMMUNICATION/EDUCATION:  
The patients plan of care was discussed with: Physical Therapist, Speech Therapist and Registered Nurse. 
[] Home safety education was provided and the patient/caregiver indicated understanding. [x] Patient/family have participated as able in goal setting and plan of care. [] Patient/family agree to work toward stated goals and plan of care. [] Patient understands intent and goals of therapy, but is neutral about his/her participation. [] Patient is unable to participate in goal setting and plan of care. This patients plan of care is appropriate for delegation to [facility].  
 
Thank you for this referral. 
Iris Arizmendi, OT 
 Time Calculation: 30 mins

## 2018-09-27 NOTE — PROGRESS NOTES
Physical Therapy Goals Initiated 9/27/2018 1. Patient will move from supine to sit and sit to supine , scoot up and down and roll side to side in bed with independence within 7 day(s). 2. Patient will transfer from bed to chair and chair to bed with independence using the least restrictive device within 7 day(s). 3. Patient will perform sit to stand with independence within 7 day(s). 4. Patient will ambulate with modified independence for 200 feet with the least restrictive device within 7 day(s). 5. Patient will ascend/descend 4 stairs with handrail(s) with modified independence within 7 day(s). physical Therapy TREATMENT Patient: Chau Cardona (66 y.o. female) Date: 9/27/2018 Diagnosis: Dystonia <principal problem not specified> Precautions: Bed Alarm, Fall Chart, physical therapy assessment, plan of care and goals were reviewed. ASSESSMENT: 
Pt presented supine, agreeable to therapy. Throughout session pt with very flat affect. When asking if pt's roommate assisted her with any ADLs pt displaying some frustration/irritation when answering that she does everything she can by herself and didn't need assistance before the onset of symptoms associated with admission. Pt able to perform bed mobility with supervision. When performing initial sit to stand with CGA, pt requiring Mariela to correct for L lateral LOB. Throughout session pt improved to S only for sit<>stand transfers. When amb pt displaying narrow base of support, decreased step clearance, lack of arm swing, and lacking heel strike (sliding on balls of feet). With gait training as described below pt would initially demonstrate improvement in gait deviations, but then she would regress back to her prior manner of amb after about 30 sec. Upon returning to room, pt with short seated rest break. When asked to rate RPE on 1-10 scale, pt reports 3-4/10.  Then continued with exercises as listed below. After exercise pt with mild HEATON, RPE 5-6/10. To end session, pt seated in chair with bed alarm attached. Pt requested for nurse to come and administer pain medication, pt rated pain 5/10 noting location in her legs, back, and feet. RN notified. Pt demonstrates increased improvement in functional mobility compared to yesterday and prior OT session earlier today. Pt with no displays of tremor throughout session. At discharge, it is recommended that pt continue with HHPT to address her impairments, as she has limited supervision and unreliable transportation at home. Progression toward goals: 
[x]    Improving appropriately and progressing toward goals 
[]    Improving slowly and progressing toward goals 
[]    Not making progress toward goals and plan of care will be adjusted PLAN: 
Patient continues to benefit from skilled intervention to address the above impairments. Continue treatment per established plan of care. Discharge Recommendations:  Home Health Further Equipment Recommendations for Discharge:  None SUBJECTIVE:  
Patient stated I feel like I get stuck and can't move sometimes.  OBJECTIVE DATA SUMMARY:  
Critical Behavior: 
Neurologic State: Alert Orientation Level: Oriented X4 Cognition: Follows commands Safety/Judgement: Awareness of environment, Insight into deficits Functional Mobility Training: 
Bed Mobility: 
  
Supine to Sit: Supervision Scooting: Supervision Transfers: 
Sit to Stand: Contact guard assistance (Mariela with initial LOB) Stand to Sit: Contact guard assistance Balance: 
Sitting: Intact Standing: Impaired (LOB on initial sit/stand transfer) Standing - Static: Good Standing - Dynamic : Good Ambulation/Gait Training: 
Distance (ft): 800 Feet (ft) Assistive Device: Gait belt Ambulation - Level of Assistance: Contact guard assistance;Supervision (reduced assistance required as session progressed) Gait Description (WDL): Exceptions to [facility] Gait Abnormalities: Altered arm swing;Decreased step clearance; Toe walking Base of Support: Narrowed Speed/Emma: Slow;Shuffled Step Length: Left shortened;Right shortened Therapeutic Exercises:  
Gait training during amb: VC and manual cues to increase arm swing, increase step clearance, increased gait speed, and facilitate heel strike. Sit to stands, emphasis on eccentric control during descent: 1 min 30sec Standing marchin min Standing marching with alt arm reachin min Activity Tolerance:  
Pt able to tolerate activity throughout session Please refer to the flowsheet for vital signs taken during this treatment. After treatment:  
[x]    Patient left in no apparent distress sitting up in chair 
[]    Patient left in no apparent distress in bed 
[x]    Call bell left within reach [x]    Nursing notified 
[]    Caregiver present [x]    Bed alarm activated COMMUNICATION/COLLABORATION:  
The patients plan of care was discussed with: Registered Nurse KATHRYN Corea Time Calculation: 23 mins Regarding student involvement in patient care: A student participated in this treatment session. Per CMS Medicare statements and APTA guidelines I certify that the following was true: 1. I was present and directly observed the entire session. 2. I made all skilled judgments and clinical decisions regarding care. 3. I am the practitioner responsible for assessment, treatment, and documentation.

## 2018-09-27 NOTE — PROGRESS NOTES
Hospitalist Progress Note NAME: Esha Reyes :  1980 MRN:  [id number] Assessment / Plan: 
Difficulty moving all 4 extremities POA- atypical symptoms on Haldol maintenance therapy Suspect dystonia from haldol Pt takes month haldol injections and claims symptoms started after the dose of haldol that she received after the dose of haldol and she didn't took recent dose considering she was concern that she had a side affect from the haldol Long acting half life is 25 days so she should still have > 25% in her system and likely reason not getting better This is the 2nd ED visit  For the same for worsening symptoms UDS, pregnancy test and CT head negative on  at outside ED facility (I reviewed paper work that she has with her) 
 
s/p 1mg IM Benztropin x 1 in ER, s/p 2nd dose today AM 12 6:31 AM 
Will monitor on neurotele IP Psych consult by Dr Ginette Kelsey awaited PT/OT eval- ? HH likely CM consulted for DC planning 
  
Fibromyalgia POA Chronic pain syndrome POA Avoid narcotic PRN tylenol and motrin for now 
  
Probable UTI (urinary tract infection) POA Cont IV rocephin for now f/u cultures 
  
Psychiatric illness/schizophrenia- is on haldol monthly shots Psych to see the patient as above 
  
 
 
25.0 - 29.9 Overweight / Body mass index is 29.12 kg/(m^2). Weight loss recommended Code status: Full Prophylaxis: Lovenox Recommended Disposition: Home w/Family soon once cleared by psych & symptoms improved +/- HH if  Recommended by PT/OT- TBD Subjective: Chief Complaint / Reason for Physician Visit: F/U Difficulty moving all extremities s/p last long acting Haldol shot for Schizophrenia \"I am ok\". Discussed with RN events overnight. Review of Systems: 
Symptom Y/N Comments  Symptom Y/N Comments Fever/Chills n   Chest Pain n   
Poor Appetite n   Edema n   
Cough n   Abdominal Pain Sputum n   Joint Pain y Muscle pain, back pain SOB/HEATON n   Pruritis/Rash Nausea/vomit    Tolerating PT/OT y Diarrhea    Tolerating Diet y Constipation    Other Could NOT obtain due to:   
 
Objective: VITALS:  
Last 24hrs VS reviewed since prior progress note. Most recent are: 
Patient Vitals for the past 24 hrs: 
 Temp Pulse Resp BP SpO2  
09/27/18 0721 98.5 °F (36.9 °C) 63 18 116/73 99 % 09/27/18 0443 98.6 °F (37 °C) 65 18 104/58 99 % 09/26/18 2348 97.5 °F (36.4 °C) 64 18 110/62 98 %  
09/26/18 1800 99 °F (37.2 °C) 76 20 116/74 97 % 09/26/18 1648 - (!) 102 10 (!) 154/99 97 % 09/26/18 1530 - 86 23 - 98 %  
09/26/18 1500 - 83 23 - 99 % 09/26/18 1430 - 84 20 - 98 %  
09/26/18 1400 - 97 23 136/80 99 % 09/26/18 1339 - 88 14 142/87 -  
09/26/18 1300 - 62 16 - -  
09/26/18 1230 - 88 20 - -  
09/26/18 1200 - 94 17 116/84 -  
09/26/18 1130 - 91 15 131/83 95 % 09/26/18 1100 - 87 16 108/70 96 %  
09/26/18 1052 98.4 °F (36.9 °C) 88 22 102/72 96 % Intake/Output Summary (Last 24 hours) at 09/27/18 0935 Last data filed at 09/27/18 5543 Gross per 24 hour Intake                0 ml Output                0 ml Net                0 ml PHYSICAL EXAM: 
General: WD, WN. Alert, cooperative, no acute distress   
EENT:  EOMI. Anicteric sclerae. MMM Resp:  CTA bilaterally, no wheezing or rales. No accessory muscle use CV:  Regular  rhythm,  No edema GI:  Soft, Non distended, Non tender.  +Bowel sounds Neurologic:  Alert and oriented X 3, normal speech, Psych:   Good insight. Not anxious nor agitated Skin:  No rashes. No jaundice Reviewed most current lab test results and cultures  YES Reviewed most current radiology test results   YES Review and summation of old records today    NO Reviewed patient's current orders and MAR    YES 
PMH/SH reviewed - no change compared to H&P 
________________________________________________________________________ Care Plan discussed with: 
  Comments Patient x Family RN x Care Manager x Consultant Multidiciplinary team rounds were held today with , nursing, pharmacist and clinical coordinator. Patient's plan of care was discussed; medications were reviewed and discharge planning was addressed. ________________________________________________________________________ Total NON critical care TIME:  36   Minutes Total CRITICAL CARE TIME Spent:   Minutes non procedure based Comments >50% of visit spent in counseling and coordination of care    
________________________________________________________________________ Tiny Uribe MD  
 
Procedures: see electronic medical records for all procedures/Xrays and details which were not copied into this note but were reviewed prior to creation of Plan. LABS: 
I reviewed today's most current labs and imaging studies. Pertinent labs include: 
Recent Labs  
   09/27/18 
 0349  09/26/18 
 1159 WBC  8.4  10.1 HGB  12.9  14.2 HCT  38.1  40.3 PLT  235  254 Recent Labs  
   09/27/18 
 0349  09/26/18 
 1159 NA  137  137  
K  3.5  3.6 CL  103  99 CO2  25  26 GLU  94  97 BUN  10  9 CREA  1.00  1.06* CA  8.9  9.7 MG   --   2.1 ALB  3.8  4.5 TBILI  0.8  0.9 SGOT  70*  48* ALT  45  49 Signed: Tiny Uribe MD

## 2018-09-27 NOTE — PROGRESS NOTES
Spiritual Care Partner Volunteer visited patient in Ortho unit on September 27, 2018. Documented by: 
Cassondra Riedel, MPS, [facility],  [facility] Service  [phone])

## 2018-09-27 NOTE — PROGRESS NOTES
Speech Pathology bedside swallow evaluation/discharge Patient: Yovanny Hidalgo (27 y.o. female) Date: 9/27/2018 Primary Diagnosis: Dystonia Precautions:     
 
ASSESSMENT : 
Based on the objective data described below, the patient presents with mild oral dysphagia characterized by prolonged mastication, however this is at least partially related to poor dentition. Full oral clearance noted. Pharyngeal phase of swallow intact and no overt s/s aspiration observed. Although patient noted that she drools and cannot swallow her saliva, this was not observed. Skilled therapy provided by a speech-language pathologist is not indicated at this time. PLAN : 
Recommendations: 
--Mechanical soft/thin liquid diet per patient preference. If patient wants diet advancement to regular, patient's diet can be advanced without SLP re-evaluation 
--Straws ok 
--Meds whole Discharge Recommendations: None SUBJECTIVE:  
Patient stated I drool and I can't swallow.  Patient alert, cooperative. Oriented x4. Flat affect. OBJECTIVE:  
 
Past Medical History:  
Diagnosis Date  Attention and concentration deficit  B[facility] disorder ([facility]  Chronic fatigue  Chronic pain  Fibromyalgia 6 years ago Dr. Simone Agee  Hearing voices Dr. Mago Andrade  Hx of depression headache  Post herpetic neuralgia  Schizophrenia ([facility]  Shingles 2 weeks ago History reviewed. No pertinent surgical history. Prior Level of Function/Home Situation:  
Home Situation Home Environment: Private residence # Steps to Enter: 3 Rails to Enter: Yes One/Two Story Residence: One story Living Alone: No 
Support Systems: Family member(s) Patient Expects to be Discharged to[de-identified] Private residence Current DME Used/Available at Home: None Diet prior to admission: soft solids/thin liquids Current Diet:  Regular/thin  
Cognitive and Communication Status: 
Neurologic State: Alert, Appropriate for age (flat affect) Orientation Level: Oriented X4 Cognition: Follows commands, Appropriate for age attention/concentration Perception: Appears intact Perseveration: No perseveration noted Safety/Judgement: Awareness of environment, Insight into deficits Oral Assessment: 
Oral Assessment Labial: Decreased rate;Decreased seal 
Dentition: Natural;Limited;Poor Oral Hygiene: moist oral mucosa Lingual: Decreased rate;Decreased strength Velum: Unable to visualize Mandible: No impairment P.O. Trials: 
Patient Position: upright in bed Vocal quality prior to P.O.: No impairment Consistency Presented: Ice chips; Thin liquid;Puree; Solid How Presented: Cup/sip;Cup/gulp;Straw;Successive swallows; Self-fed/presented;SLP-fed/presented;Spoon Bolus Acceptance: No impairment Bolus Formation/Control: Impaired Type of Impairment: Delayed;Mastication Propulsion: No impairment Oral Residue: None Initiation of Swallow: No impairment Laryngeal Elevation: Functional 
Aspiration Signs/Symptoms: None Pharyngeal Phase Characteristics: No impairment, issues, or problems Effective Modifications: Alternate liquids/solids Cues for Modifications: None Oral Phase Severity: Mild Pharyngeal Phase Severity : No impairment NOMS:  
The NOMS functional outcome measure was used to quantify this patient's level of swallowing impairment. Based on the NOMS, the patient was determined to be at level 5 for swallow function G Codes: In compliance with CMSs Claims Based Outcome Reporting, the following G-code set was chosen for this patient based the use of the NOMS functional outcome to quantify this patient's level of swallowing impairment. Using the NOMS, the patient was determined to be at level 5 for swallow function which correlates with the CJ= 20-39% level of severity. Based on the objective assessment provided within this note, the current, goal, and discharge g-codes are as follows: 
 
Swallow  Swallowing:  Swallow Current Status CJ= 20-39%  Swallow Goal Status CJ= 20-39%  Swallow D/C Status CJ= 20-39% NOMS Swallowing Levels: 
Level 1 (CN): NPO Level 2 (CM): NPO but takes consistency in therapy Level 3 (CL): Takes less than 50% of nutrition p.o. and continues with nonoral feedings; and/or safe with mod cues; and/or max diet restriction Level 4 (CK): Safe swallow but needs mod cues; and/or mod diet restriction; and/or still requires some nonoral feeding/supplements Level 5 (CJ): Safe swallow with min diet restriction; and/or needs min cues Level 6 (CI): Independent with p.o.; rare cues; usually self cues; may need to avoid some foods or needs extra time Level 7 (CH): Independent for all p.o. DIONICIO. (2003). National Outcomes Measurement System (NOMS): Adult Speech-Language Pathology User's Guide. Pain: After treatment:  
[] Patient left in no apparent distress sitting up in chair 
[x] Patient left in no apparent distress in bed 
[x] Call bell left within reach [x] Nursing notified 
[] Caregiver present 
[] Bed alarm activated COMMUNICATION/EDUCATION:  
The patients plan of care including findings, recommendations, and recommended diet changes were discussed with: Registered Nurse. [x] Patient/family have participated as able and agree with findings and recommendations. [] Patient is unable to participate in plan of care at this time. Thank you for this referral. 
Romayne Crape, SLP Time Calculation: 20 mins

## 2018-09-27 NOTE — PROGRESS NOTES
* No surgery found * 
* No surgery found * Bedside and Verbal shift change report given to Trevor oPlk (oncoming nurse) by ER (offgoing nurse). Report included the following information SBAR, Kardex, Recent Results, Med Rec Status and Cardiac Rhythm SR/SB. Zone Phone:   [phone] Significant changes during shift:  Admission Patient Information Vaishali Colindres 43 y.o. 
9/26/2018 10:46 AM by Moira Salcedo MD. Vaishali Colindres was admitted from Home 
 
Problem List 
 
Patient Active Problem List  
 Diagnosis Date Noted  Dystonia 09/26/2018  UTI (urinary tract infection) 09/26/2018  Chronic pain 09/26/2018  Psychiatric illness 09/26/2018  ADHD (attention deficit hyperactivity disorder) 02/27/2013  Low back pain 07/12/2012  Fibromyalgia 07/12/2012  Ketonuria 07/12/2012  Proteinuria 07/12/2012 Past Medical History:  
Diagnosis Date  Attention and concentration deficit  [facility]  Chronic fatigue  Chronic pain  Fibromyalgia 6 years ago Dr. Paul Moore  Hearing voices Dr. Juana Trinidad  Hx of depression headache  Post herpetic neuralgia  Schizophrenia ([facility] [facility]  Shingles 2 weeks ago Core Measures: CVA: No No 
CHF:No No 
PNA:No No 
 
Post Op Surgical (If Applicable):  
 
Number times ambulated in hallway past shift:  0 Number of times OOB to chair past shift:   0 
NG Tube: No 
Incentive Spirometer: No 
Drains: No   Volume  0 Dressing Present:  No 
Flatus:  Not applicable Activity Status: OOB to Chair No 
Ambulated this shift No  
Bed Rest No 
 
Supplemental O2: (If Applicable) NC No 
NRB No 
Venti-mask No 
On 0 Liters/min LINES AND DRAINS: 
 
Central Line? No  
PICC LINE? No  
Urinary Catheter? No  
DVT prophylaxis: DVT prophylaxis Med- Yes DVT prophylaxis SCD or MEI- No  
 
Wounds: (If Applicable) Wounds- No 
 
Location 0 Patient Safety: 
 
Falls Score Total Score: 1 Safety Level_______ Bed Alarm On? Yes Sitter? No 
 
Plan for upcoming shift: Safety, monitor for output, PT/OT in AM, Psychiatry evaluation in AM 
 
 
 
Discharge Plan: Yes TBD Active Consults: 
IP CONSULT TO PSYCHIATRY

## 2018-09-27 NOTE — PROGRESS NOTES
Bedside shift change report given to William Vang RN (oncoming nurse) by Magdalena Recinos RN (offgoing nurse). Report included the following information SBAR, Kardex, ED Summary, Intake/Output and Recent Results.

## 2018-09-27 NOTE — PROGRESS NOTES
Reason for Admission:   Dystonia RRAT Score:          4 Plan for utilizing home health:     yes Likelihood of Readmission:  low Transition of Care Plan:       Home with home health, f/u appts Pt is a 43 y.o  female admitted with Dystonia. Pt was alert, oriented resting in bed. Demographic information verified and her address was not correct and this CM updated it. Pt lives with a roommate in a 1 story home with 3 steps to the entrance. Prior to admission, pt stated she was independent with her ADL's and IADL's. Pt doesn't drive and has difficulty getting rides at times. Pt's mother lives in [place] and assist pt at times. Denies using DME or having home health and rehab in the past. Pt is not followed by a mental health . Pt doesn't have a PCP and is in agreement to have a BS PCP. CM will set up pt with a BS PCP. Preferred pharmacy is [facility]. CM will continue to follow pt for discharge planning needs. Care Management Interventions PCP Verified by CM: Yes Mode of Transport at Discharge: Other (see comment) (pt's roommate or mother can transport by car) Transition of Care Consult (CM Consult): Discharge Planning Discharge Durable Medical Equipment: No 
Physical Therapy Consult: Yes Occupational Therapy Consult: Yes Speech Therapy Consult: Yes Current Support Network: Other, Own Home (lives with roommate in a 1 story home with 3 steps to the entrance) Confirm Follow Up Transport: Family Plan discussed with Pt/Family/Caregiver: Yes Discharge Location Discharge Placement: Home Emilie Mom, [phone]

## 2018-09-27 NOTE — PROGRESS NOTES
* No surgery found * 
* No surgery found * Bedside and Verbal shift [facility] by Javan Castleman (offgoing nurse). Report included the following information SBAR, Kardex, Recent Results, Med Rec Status and Cardiac Rhythm SR/SB. Zone Phone:   [phone] Significant changes during shift:  UP in chair Walked in [place] with PT Patient Information Romayne Ok 43 y.o. 
9/26/2018 10:46 AM by Glynn Eugene MD. Romayne Ok was admitted from Home 
 
Problem List 
 
Patient Active Problem List  
 Diagnosis Date Noted  Dystonia 09/26/2018  UTI (urinary tract infection) 09/26/2018  Chronic pain 09/26/2018  Psychiatric illness 09/26/2018  ADHD (attention deficit hyperactivity disorder) 02/27/2013  Low back pain 07/12/2012  Fibromyalgia 07/12/2012  Ketonuria 07/12/2012  Proteinuria 07/12/2012 Past Medical History:  
Diagnosis Date  Attention and concentration deficit  Bipolar disorder ([facility]  Chronic fatigue  Chronic pain  Fibromyalgia 6 years ago Dr. Jj Saenz  Hearing voices Dr. Ranjit Soria  Hx of depression headache  Post herpetic neuralgia  Schizophrenia ([facility]  Shingles 2 weeks ago Core Measures: CVA: No No 
CHF:No No 
PNA:No No 
 
Post Op Surgical (If Applicable):  
 
Number times ambulated in hallway past shift:  0 Number of times OOB to chair past shift:   0 
NG Tube: No 
Incentive Spirometer: No 
Drains: No   Volume  0 Dressing Present:  No 
Flatus:  Not applicable Activity Status: OOB to Chair yes Ambulated this shift yes Bed Rest No 
 
Supplemental O2: (If Applicable) NC No 
NRB No 
Venti-mask No 
On 0 Liters/min LINES AND DRAINS: 
 
Central Line? No  
PICC LINE? No  
Urinary Catheter? No  
DVT prophylaxis: DVT prophylaxis Med- Yes DVT prophylaxis SCD or MEI- No  
 
Wounds: (If Applicable) Wounds- No 
 
Location 0 Patient Safety: Falls Score Total Score: 4 Safety Level_______ Bed Alarm On? Yes Sitter? No 
 
Plan for upcoming shift: , Psychiatry to see this pm 
 
 
 
Discharge Plan: Home with [facility] health Active Consults: 
IP CONSULT TO PSYCHIATRY

## 2018-09-27 NOTE — PROGRESS NOTES
Notified Dr Tashi Aldridge that patient stated she feels she has difficulty chewing. Orders received for Speech Therapy consult and to make sure psych consult done today

## 2018-09-28 PROCEDURE — 97535 SELF CARE MNGMENT TRAINING: CPT

## 2018-09-28 PROCEDURE — 74011250636 HC RX REV CODE- 250/636: Performed by: INTERNAL MEDICINE

## 2018-09-28 PROCEDURE — 97116 GAIT TRAINING THERAPY: CPT

## 2018-09-28 PROCEDURE — 74011250637 HC RX REV CODE- 250/637: Performed by: PSYCHIATRY & NEUROLOGY

## 2018-09-28 PROCEDURE — 65660000000 HC RM CCU STEPDOWN

## 2018-09-28 PROCEDURE — 74011250637 HC RX REV CODE- 250/637: Performed by: EMERGENCY MEDICINE

## 2018-09-28 RX ADMIN — BENZTROPINE MESYLATE 1 MG: 1 TABLET ORAL at 10:11

## 2018-09-28 RX ADMIN — HEPARIN SODIUM 5000 UNITS: 5000 INJECTION INTRAVENOUS; SUBCUTANEOUS at 21:13

## 2018-09-28 RX ADMIN — KETOROLAC TROMETHAMINE 15 MG: 30 INJECTION, SOLUTION INTRAMUSCULAR at 18:33

## 2018-09-28 RX ADMIN — Medication 10 ML: at 21:13

## 2018-09-28 RX ADMIN — HEPARIN SODIUM 5000 UNITS: 5000 INJECTION INTRAVENOUS; SUBCUTANEOUS at 14:09

## 2018-09-28 RX ADMIN — Medication 10 ML: at 06:55

## 2018-09-28 RX ADMIN — HEPARIN SODIUM 5000 UNITS: 5000 INJECTION INTRAVENOUS; SUBCUTANEOUS at 06:54

## 2018-09-28 RX ADMIN — Medication 10 ML: at 14:09

## 2018-09-28 RX ADMIN — Medication 10 ML: at 06:54

## 2018-09-28 RX ADMIN — BENZTROPINE MESYLATE 1 MG: 1 TABLET ORAL at 18:28

## 2018-09-28 RX ADMIN — ZOLPIDEM TARTRATE 5 MG: 5 TABLET ORAL at 21:13

## 2018-09-28 NOTE — PROGRESS NOTES
Problem: Mobility Impaired (Adult and Pediatric) Goal: *Acute Goals and Plan of Care (Insert Text) Physical Therapy Goals Initiated 9/27/2018 1. Patient will move from supine to sit and sit to supine , scoot up and down and roll side to side in bed with independence within 7 day(s). 2.  Patient will transfer from bed to chair and chair to bed with independence using the least restrictive device within 7 day(s). 3.  Patient will perform sit to stand with independence within 7 day(s). 4.  Patient will ambulate with modified independence for 200 feet with the least restrictive device within 7 day(s). 5.  Patient will ascend/descend 4 stairs with handrail(s) with modified independence within 7 day(s). physical Therapy TREATMENT Patient: Allie Norwood (89 y.o. female) Date: 9/28/2018 Diagnosis: Dystonia <principal problem not specified> Precautions: Bed Alarm, Fall Chart, physical therapy assessment, plan of care and goals were reviewed. ASSESSMENT: 
Today's session focused on improved patient's impaired gait mechanics. She continues to make inconsistent progress, as she at times demonstrates adequate gait speed, arm swing, and heel strike, yet at others lacks altogether with rigid hypertonic pattern. Much of gait focused on dynamic stability tasks as noted below, as well as repeated laps with ever-increasing focus on deficits. With focus on <1 issue, patient unable to maintain improvements. Overall, she is making progress, and at times appeared pleased with such with an improved mental demeanor. She may likely need only 1-2 visits ahead as she nears acute PT goals. Progression toward goals: 
[]    Improving appropriately and progressing toward goals [x]    Improving slowly and progressing toward goals 
[]    Not making progress toward goals and plan of care will be adjusted PLAN: 
Patient continues to benefit from skilled intervention to address the above impairments. Continue treatment per established plan of care. Discharge Recommendations:  Home Health Further Equipment Recommendations for Discharge:  none SUBJECTIVE:  
Patient stated Ardeen Bi you clean my glasses?  OBJECTIVE DATA SUMMARY:  
Critical Behavior: 
Neurologic State: Alert Orientation Level: Oriented X4 Cognition: Follows commands, Poor safety awareness, Impulsive Safety/Judgement: Awareness of environment, Insight into deficits Functional Mobility Training: 
Bed Mobility: 
  
Supine to Sit: Independent Sit to Supine: Moderate assistance (Stating \"i can't\" asking for help to move BLEs/shoulders) Transfers: 
Sit to Stand: Supervision Stand to Sit: Supervision Balance: 
Sitting: Intact Standing: Impaired Standing - Static: Good Standing - Dynamic : Fair Ambulation/Gait Training: 
Distance (ft): 800 Feet (ft) Assistive Device: Gait belt Ambulation - Level of Assistance: Supervision Gait Abnormalities: Altered arm swing;Decreased step clearance; Toe walking (improving) Base of Support: Narrowed Speed/Emma:  (improving ) Step Length: Right shortened;Left shortened (improving) During this, alternating periods of side-stepping, stop and go, stop and turn around, monster stepping, retro-stepping, stepping eyes closed, head turns both horizontal and vertical, head turns horizontal while calling out room numbers, holding cup of ice ahead Stairs: 
Number of Stairs Trained: 11 Stairs - Level of Assistance: Supervision Rail Use: Left  (step through) Pain: 
Pain Scale 1: Numeric (0 - 10) Pain Intensity 1: 2 Activity Tolerance:  
Improving Please refer to the flowsheet for vital signs taken during this treatment. After treatment:  
[x]    Patient left in no apparent distress sitting up in chair 
[]    Patient left in no apparent distress in bed 
[x]    Call bell left within reach [x]    Nursing notified 
[]    Caregiver present [x]    Bed alarm activated COMMUNICATION/COLLABORATION:  
The patients plan of care was discussed with: Registered Nurse Nilson Trinidad, PT, DPT Board-Certified Geriatric Clinical Specialist  
Certified Exercise Expert for Aging Adults Time Calculation: 26 mins

## 2018-09-28 NOTE — PROGRESS NOTES
Bedside and Verbal shift change report given to mary cooley (oncoming nurse) by Funmi Mercado (offgoing nurse). Report included the following information SBAR, Kardex, Intake/Output, MAR and Recent Results.

## 2018-09-28 NOTE — PROGRESS NOTES
Dr Bev Chapa on unit and notified that patients HR drops to 40's when sleeping. He stated it probably coming form her medications and that is fine.

## 2018-09-28 NOTE — PROGRESS NOTES
Problem: Falls - Risk of 
Goal: *Absence of Falls Document Jass Oliveira Fall Risk and appropriate interventions in the flowsheet. Fall Risk Interventions: 
Mobility Interventions: Bed/chair exit alarm, PT Consult for mobility concerns Medication Interventions: Evaluate medications/consider consulting pharmacy, Bed/chair exit alarm Elimination Interventions: Bed/chair exit alarm, Patient to call for help with toileting needs, Call light in reach History of Falls Interventions: Bed/chair exit alarm, Evaluate medications/consider consulting pharmacy

## 2018-09-28 NOTE — PROGRESS NOTES
* No surgery found * 
* No surgery found * Bedside and Verbal shift change report given to [address] (oncoming nurse) by Jaspreet Blakely RN (offgoing nurse). Report included the following information SBAR, Kardex, Recent Results, Med Rec Status and Cardiac Rhythm SR/SB. Zone Phone:   [phone] Significant changes during shift:  Requested toradol for pain and was given one dose, MD muñoz to order every 8 hours if no reaction; seen by psychiatry and was given cogentin 2 mg po Patient Information Loretta Peterson 43 y.o. 
9/26/2018 10:46 AM by Yolanda Decker MD. Loretta Peterson was admitted from Home 
 
Problem List 
 
Patient Active Problem List  
 Diagnosis Date Noted  Dystonia 09/26/2018  UTI (urinary tract infection) 09/26/2018  Chronic pain 09/26/2018  Psychiatric illness 09/26/2018  ADHD (attention deficit hyperactivity disorder) 02/27/2013  Low back pain 07/12/2012  Fibromyalgia 07/12/2012  Ketonuria 07/12/2012  Proteinuria 07/12/2012 Past Medical History:  
Diagnosis Date  Attention and concentration deficit  Bipolar disorder ([facility]  Chronic fatigue  Chronic pain  Fibromyalgia 6 years ago Dr. Tremayne Alcazar  Hearing voices Dr. Mars Danielle  Hx of depression headache  Post herpetic neuralgia  Schizophrenia ([facility]  Shingles 2 weeks ago Core Measures: CVA: No No 
CHF:No No 
PNA:No No 
 
Post Op Surgical (If Applicable):  
 
Number times ambulated in hallway past shift:  0 Number of times OOB to chair past shift:   0 
NG Tube: No 
Incentive Spirometer: No 
Drains: No   Volume  0 Dressing Present:  No 
Flatus:  Not applicable Activity Status: OOB to Chair yes Ambulated this shift yes Bed Rest No 
 
Supplemental O2: (If Applicable) NC No 
NRB No 
Venti-mask No 
On 0 Liters/min LINES AND DRAINS: 
 
Central Line? No  
PICC LINE? No  
Urinary Catheter? No  
DVT prophylaxis: DVT prophylaxis Med- Yes DVT prophylaxis SCD or MEI- No  
 
Wounds: (If Applicable) Wounds- No 
 
Location 0 Patient Safety: 
 
Falls Score Total Score: 4 Safety Level_______ Bed Alarm On? Yes Sitter? No 
 
Plan for upcoming shift: , safety, Discharge Plan: Home with [facility] health Active Consults: 
IP CONSULT TO PSYCHIATRY

## 2018-09-28 NOTE — PROGRESS NOTES
* No surgery found * 
* No surgery found * Bedside and Verbal shift change [facility] by Anette Osgood (offgoing nurse). Report included the following information SBAR, Kardex, Recent Results, Med Rec Status and Cardiac Rhythm SR/SB. Zone Phone:   [phone] Significant changes during shift:  UP in chair Walked with therapy. SEE note regarding discharge to  psych tomorrow Patient Information Pop Robles 43 y.o. 
9/26/2018 10:46 AM by Latisha Subramanian MD. Pop Robles was admitted from Home 
 
Problem List 
 
Patient Active Problem List  
 Diagnosis Date Noted  Dystonia 09/26/2018  UTI (urinary tract infection) 09/26/2018  Chronic pain 09/26/2018  Psychiatric illness 09/26/2018  ADHD (attention deficit hyperactivity disorder) 02/27/2013  Low back pain 07/12/2012  Fibromyalgia 07/12/2012  Ketonuria 07/12/2012  Proteinuria 07/12/2012 Past Medical History:  
Diagnosis Date  Attention and concentration deficit  Bipolar disorder ([facility]  Chronic fatigue  Chronic pain  Fibromyalgia 6 years ago Dr. Jesse Gonsalves  Hearing voices Dr. Dimitri Leone  Hx of depression headache  Post herpetic neuralgia  Schizophrenia ([facility]  Shingles 2 weeks ago Core Measures: CVA: No No 
CHF:No No 
PNA:No No 
 
Post Op Surgical (If Applicable):  
 
Number times ambulated in hallway past shift:  0 Number of times OOB to chair past shift:   0 
NG Tube: No 
Incentive Spirometer: No 
Drains: No   Volume  0 Dressing Present:  No 
Flatus:  Not applicable Activity Status: OOB to Chair yes Ambulated this shift yes Bed Rest No 
 
Supplemental O2: (If Applicable) NC No 
NRB No 
Venti-mask No 
On 0 Liters/min LINES AND DRAINS: 
 
Central Line? No  
PICC LINE? No  
Urinary Catheter? No  
DVT prophylaxis: DVT prophylaxis Med- Yes DVT prophylaxis SCD or MEI- No  
 
Wounds: (If Applicable) Wounds- No 
 
 Location 0 Patient Safety: 
 
Falls Score Total Score: 4 Safety Level_______ Bed Alarm On? Yes Sitter? No 
 
Plan for upcoming shift: ,safety Discharge Plan:IP psych when bed available Active Consults: 
IP CONSULT TO PSYCHIATRY

## 2018-09-28 NOTE — PROGRESS NOTES
CM met with pt and discussed Dr. Fidel Cazares and Dr. Yoandy Stokes of going to an inpt psych unit. Pt stated she was okay with going. CM contacted Dr. Lilian Hurley and informed him. CM called bed placement, [phone] and provided pt's information. They will call back with an update on bed availability. Jess Rose, [facility]

## 2018-09-28 NOTE — PROGRESS NOTES
Problem: Self Care Deficits Care Plan (Adult) Goal: *Acute Goals and Plan of Care (Insert Text) Occupational Therapy Goals Initiated 9/27/2018 1. Patient will perform grooming standing at sink with independence within 7 day(s). 2.  Patient will perform upper body dressing and lower body dressing with independence within 7 day(s). 3.  Patient will perform bathing with independence within 7 day(s). 4.  Patient will perform toilet transfers in bathroom with independence within 7 day(s). 5.  Patient will perform all aspects of toileting with independence within 7 day(s). Occupational Therapy TREATMENT Patient: Argenis Alanis (72 y.o. female) Date: 9/28/2018 Diagnosis: Dystonia <principal problem not specified> Precautions: Bed Alarm, Fall Chart, occupational therapy assessment, plan of care, and goals were reviewed. ASSESSMENT: 
Patient received in bathroom, bed alarm activated, getting up to bathroom independently. Not wearing non-slip socks. Patient requiring assist for pulling pants up, despite full ROM with generalized weakness. Patient ambulating to sink, able to  individual ADL items and put toothpaste on brush however when going to brush teeth patient presenting with increased tremors and asking therapist to brush her teeth for her. Max A provided and then patient encouraged to complete remainder herself, pushing toothbrush back into mouth causing herself to gag, task aborted. Patient was able to bring hand full of water to her mouth smoothly. Returned to bed, able to bring feet into bed and then stating she was unable to finish bringing them into bed or lift them any longer. Mod A for repositioning. Patient remains below baseline and will require rehab when medically stable. Progression toward goals: 
[]       Improving appropriately and progressing toward goals [x]       Improving slowly and progressing toward goals []       Not making progress toward goals and plan of care will be adjusted PLAN: 
Patient continues to benefit from skilled intervention to address the above impairments. Continue treatment per established plan of care. Discharge Recommendations:  Rehab Further Equipment Recommendations for Discharge:  TBD SUBJECTIVE:  
Patient stated You need to brush my teeth.  OBJECTIVE DATA SUMMARY:  
Cognitive/Behavioral Status: 
Neurologic State: Alert Orientation Level: Oriented X4 Cognition: Follows commands;Poor safety awareness; Impulsive Functional Mobility and Transfers for ADLs: 
Bed Mobility: 
Supine to Sit: Independent Sit to Supine: Moderate assistance (Stating \"i can't\" asking for help to move BLEs/shoulders) Transfers: 
Sit to Stand: Supervision Functional Transfers Toilet Transfer : Supervision Balance: 
Sitting: Intact Standing: Impaired Standing - Static: Good Standing - Dynamic : Fair ADL Intervention: 
Grooming Brushing Teeth: Maximum assistance (Standing at sink) Lower Body Dressing Assistance Underpants: Maximum assistance Toileting Bladder Hygiene: Supervision/set-up Therapeutic Exercises:  
- Patient tolerating standing at sink for approximately 2 minutes Pain: 
Pain Scale 1: Numeric (0 - 10) Pain Intensity 1: 2 Activity Tolerance:  
Good. Please refer to the flowsheet for vital signs taken during this treatment. After treatment:  
[] Patient left in no apparent distress sitting up in chair 
[x] Patient left in no apparent distress in bed 
[x] Call bell left within reach [x] Nursing notified 
[] Caregiver present [x] Bed alarm activated COMMUNICATION/COLLABORATION:  
The patients plan of care was discussed with: Physical Therapist and Registered Nurse Mahendra Bueno OT Time Calculation: 8 mins

## 2018-09-28 NOTE — PROGRESS NOTES
Spoke Dr Jesus Lara who is on for psych today and read him DR West's note. He stated if DR Merlinda Howard stated patient was medically stable for discharge to have  start finding IP Psych bed and if patient wasn't willing to go to IP psych to have her TDO. Spoke to Dr Merlinda Howard who stated to  have  start finding IP bed for patient and if bed is found she may be discharged tomorrow.

## 2018-09-28 NOTE — PROGRESS NOTES
Spoke to Dr Johnnie Villafana regarding request for toradol MD stated to give her 15 mg of toradol for the pain and if no reaction from it just ordered 15 every 8 hours PRN

## 2018-09-28 NOTE — PROGRESS NOTES
Hospitalist Progress Note NAME: Romayne Ok :  1980 MRN:  [id number] Assessment / Plan: 
Difficulty moving all 4 extremities POA- atypical symptoms on Haldol maintenance therapy Suspect dystonia from haldol Pt takes month haldol injections and claims symptoms started after the dose of haldol that she received after the dose of haldol and she didn't took recent dose considering she was concern that she had a side affect from the haldol Long acting half life is 25 days so she should still have > 25% in her system and likely reason not getting better This is the 2nd ED visit  For the same for worsening symptoms UDS, pregnancy test and CT head negative on  at outside ED facility (I reviewed paper work that she has with her) 
 
s/p 1mg IM Benztropin x 1 in ER, s/p 2nd dose yesterday AM 
Will monitor on neurotele IP Psych consult by Dr Cordell Kanner appreciated- started on Cogentin BID 
CM consulted for DC planning- will need IP psych transfer as per Dr Cordell Kanner (Psych) 
  Fibromyalgia POA Chronic pain syndrome POA Avoid narcotic PRN tylenol and motrin for now 
  
Probable UTI (urinary tract infection) POA- ruled out with neg Cx (coag neg staph- likely contaminant) s/p IV rocephin - DC today 
 
  
Psychiatric illness/schizophrenia- is on haldol monthly shots Psych to see the patient as above 
  
 
 
25.0 - 29.9 Overweight / Body mass index is 29.12 kg/(m^2). Weight loss recommended Code status: Full Prophylaxis: Lovenox Recommended Disposition:IP Psych recommended by Psych yesterday- CM consulted for DC planning Subjective: Chief Complaint / Reason for Physician Visit: F/U Difficulty moving all extremities s/p last long acting Haldol shot for Schizophrenia \"I am ok\". Discussed with RN events overnight. Review of Systems: 
Symptom Y/N Comments  Symptom Y/N Comments Fever/Chills n   Chest Pain n   
Poor Appetite n   Edema n   
Cough n   Abdominal Pain Sputum n   Joint Pain y Muscle pain, back pain SOB/HEATON n   Pruritis/Rash Nausea/vomit    Tolerating PT/OT y Diarrhea    Tolerating Diet y Constipation    Other Could NOT obtain due to:   
 
Objective: VITALS:  
Last 24hrs VS reviewed since prior progress note. Most recent are: 
Patient Vitals for the past 24 hrs: 
 Temp Pulse Resp BP SpO2  
09/28/18 1151 98.1 °F (36.7 °C) (!) 52 16 112/80 98 %  
09/28/18 0743 98 °F (36.7 °C) 72 16 119/76 100 % 09/28/18 0350 98 °F (36.7 °C) 63 18 114/74 100 % 09/28/18 0125 - (!) 43 16 106/79 100 % 09/27/18 2311 98.6 °F (37 °C) (!) 53 18 109/69 98 %  
09/27/18 1946 98.6 °F (37 °C) 63 18 114/70 98 %  
09/27/18 1455 98.3 °F (36.8 °C) 65 18 112/75 98 % Intake/Output Summary (Last 24 hours) at 09/28/18 1239 Last data filed at 09/27/18 1750 Gross per 24 hour Intake              530 ml Output                0 ml Net              530 ml PHYSICAL EXAM: 
General: WD, WN. Alert, cooperative, no acute distress   
EENT:  EOMI. Anicteric sclerae. MMM Resp:  CTA bilaterally, no wheezing or rales. No accessory muscle use CV:  Regular  rhythm,  No edema GI:  Soft, Non distended, Non tender.  +Bowel sounds Neurologic:  Alert and oriented X 3, normal speech, Psych:   Fair insight. Not anxious nor agitated, flat affect noted + Skin:  No rashes. No jaundice Reviewed most current lab test results and cultures  YES Reviewed most current radiology test results   YES Review and summation of old records today    NO Reviewed patient's current orders and MAR    YES 
PMH/SH reviewed - no change compared to H&P 
________________________________________________________________________ Care Plan discussed with: 
  Comments Patient x Family RN x Care Manager x Rosalie Barney Consultant Multidiciplinary team rounds were held today with , nursing, pharmacist and clinical coordinator.   Patient's plan of care was discussed; medications were reviewed and discharge planning was addressed. ________________________________________________________________________ Total NON critical care TIME:  26   Minutes Total CRITICAL CARE TIME Spent:   Minutes non procedure based Comments >50% of visit spent in counseling and coordination of care    
________________________________________________________________________ Chris Osuna MD  
 
Procedures: see electronic medical records for all procedures/Xrays and details which were not copied into this note but were reviewed prior to creation of Plan. LABS: 
I reviewed today's most current labs and imaging studies. Pertinent labs include: 
Recent Labs  
   09/27/18 
 0349 09/26/18 
 1159 WBC  8.4  10.1 HGB  12.9  14.2 HCT  38.1  40.3 PLT  235  254 Recent Labs  
   09/27/18 
 0349  09/26/18 
 1159 NA  137  137  
K  3.5  3.6 CL  103  99 CO2  25  26 GLU  94  97 BUN  10  9 CREA  1.00  1.06* CA  8.9  9.7 MG   --   2.1 ALB  3.8  4.5 TBILI  0.8  0.9 SGOT  70*  48* ALT  45  49 Signed: Chris Osuna MD

## 2018-09-29 ENCOUNTER — HOSPITAL ENCOUNTER (INPATIENT)
Age: 38
LOS: 6 days | Discharge: HOME | DRG: 750 | End: 2018-10-05
Attending: PSYCHIATRY & NEUROLOGY | Admitting: PSYCHIATRY & NEUROLOGY
Payer: MEDICAID

## 2018-09-29 VITALS
OXYGEN SATURATION: 98 % | RESPIRATION RATE: 18 BRPM | DIASTOLIC BLOOD PRESSURE: 67 MMHG | HEIGHT: 65 IN | WEIGHT: 175 LBS | HEART RATE: 60 BPM | SYSTOLIC BLOOD PRESSURE: 120 MMHG | BODY MASS INDEX: 29.16 KG/M2 | TEMPERATURE: 98.2 F

## 2018-09-29 PROBLEM — F20.9 SCHIZOPHRENIA (HCC): Status: ACTIVE | Noted: 2018-09-29

## 2018-09-29 LAB
BACTERIA SPEC CULT: ABNORMAL
CC UR VC: ABNORMAL
SERVICE CMNT-IMP: ABNORMAL

## 2018-09-29 PROCEDURE — 65220000003 HC RM SEMIPRIVATE PSYCH

## 2018-09-29 PROCEDURE — 90471 IMMUNIZATION ADMIN: CPT

## 2018-09-29 PROCEDURE — 90686 IIV4 VACC NO PRSV 0.5 ML IM: CPT | Performed by: INTERNAL MEDICINE

## 2018-09-29 PROCEDURE — 74011250637 HC RX REV CODE- 250/637: Performed by: PSYCHIATRY & NEUROLOGY

## 2018-09-29 PROCEDURE — 74011250636 HC RX REV CODE- 250/636: Performed by: INTERNAL MEDICINE

## 2018-09-29 RX ORDER — ACETAMINOPHEN 325 MG/1
650 TABLET ORAL
Status: DISCONTINUED | OUTPATIENT
Start: 2018-09-29 | End: 2018-10-05 | Stop reason: HOSPADM

## 2018-09-29 RX ORDER — LORAZEPAM 1 MG/1
1 TABLET ORAL
Status: DISCONTINUED | OUTPATIENT
Start: 2018-09-29 | End: 2018-10-05 | Stop reason: HOSPADM

## 2018-09-29 RX ORDER — IBUPROFEN 400 MG/1
400 TABLET ORAL
Status: DISCONTINUED | OUTPATIENT
Start: 2018-09-29 | End: 2018-10-05 | Stop reason: HOSPADM

## 2018-09-29 RX ORDER — IBUPROFEN 200 MG
1 TABLET ORAL
Status: DISCONTINUED | OUTPATIENT
Start: 2018-09-29 | End: 2018-10-05 | Stop reason: HOSPADM

## 2018-09-29 RX ORDER — BENZTROPINE MESYLATE 2 MG/1
2 TABLET ORAL
Status: DISCONTINUED | OUTPATIENT
Start: 2018-09-29 | End: 2018-10-05 | Stop reason: HOSPADM

## 2018-09-29 RX ORDER — BENZTROPINE MESYLATE 1 MG/ML
2 INJECTION INTRAMUSCULAR; INTRAVENOUS
Status: DISCONTINUED | OUTPATIENT
Start: 2018-09-29 | End: 2018-10-05 | Stop reason: HOSPADM

## 2018-09-29 RX ORDER — LORAZEPAM 2 MG/ML
2 INJECTION INTRAMUSCULAR
Status: DISCONTINUED | OUTPATIENT
Start: 2018-09-29 | End: 2018-10-05 | Stop reason: HOSPADM

## 2018-09-29 RX ORDER — ZOLPIDEM TARTRATE 5 MG/1
5 TABLET ORAL
Qty: 30 TAB | Refills: 0 | Status: SHIPPED
Start: 2018-09-29 | End: 2018-10-05

## 2018-09-29 RX ORDER — BENZTROPINE MESYLATE 1 MG/1
1 TABLET ORAL 2 TIMES DAILY
Qty: 60 TAB | Refills: 0 | Status: SHIPPED
Start: 2018-09-29 | End: 2018-10-05

## 2018-09-29 RX ORDER — ZOLPIDEM TARTRATE 10 MG/1
10 TABLET ORAL
Status: DISCONTINUED | OUTPATIENT
Start: 2018-09-29 | End: 2018-10-05 | Stop reason: HOSPADM

## 2018-09-29 RX ORDER — OLANZAPINE 5 MG/1
5 TABLET ORAL
Status: DISCONTINUED | OUTPATIENT
Start: 2018-09-29 | End: 2018-10-05 | Stop reason: HOSPADM

## 2018-09-29 RX ORDER — ADHESIVE BANDAGE
30 BANDAGE TOPICAL DAILY PRN
Status: DISCONTINUED | OUTPATIENT
Start: 2018-09-29 | End: 2018-10-05 | Stop reason: HOSPADM

## 2018-09-29 RX ADMIN — Medication 10 ML: at 05:25

## 2018-09-29 RX ADMIN — BENZTROPINE MESYLATE 2 MG: 2 TABLET ORAL at 21:24

## 2018-09-29 RX ADMIN — BENZTROPINE MESYLATE 1 MG: 1 TABLET ORAL at 08:12

## 2018-09-29 RX ADMIN — INFLUENZA VIRUS VACCINE 0.5 ML: 15; 15; 15; 15 SUSPENSION INTRAMUSCULAR at 14:38

## 2018-09-29 RX ADMIN — LORAZEPAM 1 MG: 1 TABLET ORAL at 18:57

## 2018-09-29 RX ADMIN — HEPARIN SODIUM 5000 UNITS: 5000 INJECTION INTRAVENOUS; SUBCUTANEOUS at 05:24

## 2018-09-29 RX ADMIN — ZOLPIDEM TARTRATE 10 MG: 10 TABLET ORAL at 21:25

## 2018-09-29 NOTE — BH NOTES
Patient verbalizes complaints of anxiety. PRN Ativan PO given per order. Will continue to monitor patient and assess needs per [facility] protocol.

## 2018-09-29 NOTE — DISCHARGE INSTRUCTIONS
HOSPITALIST DISCHARGE INSTRUCTIONS    NAME: Cira Billings   :  1980   MRN:  [id number]     Date/Time:  2018 1:17 PM    ADMIT DATE: 2018     DISCHARGE DATE: 2018     DISCHARGE DIAGNOSIS:  Difficulty moving all 4 extremities POA- atypical symptoms on Haldol maintenance therapy, cleared by PT/OT for [facility] this admission  Suspect dystonia from haldol?- started on Cogentin BID as per Dr Mirna Black recommendations  Psychiatric illness/schizophrenia POA- was started on haldol monthly shots when she was DC from [facility] 1 month ago- now stopped for now, IP Psych recommended for meds optimization per Psychiatry evaluation- Dr Jane Hernadez[facility]  Fibromyalgia POA  Chronic pain syndrome POA- avoid opioids  Probable UTI (urinary tract infection) POA- ruled out with neg Cx (coag neg staph- likely contaminant)- s/p rocephin here, now off it    Active Problems:    Fibromyalgia (2012)      Dystonia (2018)      UTI (urinary tract infection) (2018)      Chronic pain (2018)      Psychiatric illness (2018)         MEDICATIONS:  As per medication reconciliation  list  · It is important that you take the medication exactly as they are prescribed. · Keep your medication in the bottles provided by the pharmacist and keep a list of the medication names, dosages, and times to be taken in your wallet. · Do not take other medications without consulting your doctor. Pain Management: per above medications    What to do at Home    Recommended diet:  Regular Diet    Recommended activity: Activity as tolerated    If you have questions regarding the hospital related prescriptions or [facility] related issues [phone]. Information obtained by :  I understand that if any problems occur once I am at home I am to contact my physician. I understand and acknowledge receipt of the instructions indicated above. Physician's or R.N.'s Signature                                                                  Date/Time                                                                                                                                              Patient or Representative Signature                                                          Date/Time

## 2018-09-29 NOTE — IP AVS SNAPSHOT
[facility] 
 
 
 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 at [address] [facility] Gabriella, [address] [facility] Tereza Pickard ph: [phone] 
fax: [phone] [facility] Mental health support services  Erika Valverde from [facility] mental health support services will contact you and come to your home to begin services [phone] [facility]   Ph: [phone] John Loredo MD   [address] [place] [id number] 
[phone] Discharge Orders None A check wiliam indicates which time of day the medication should be taken. My Medications START taking these medications Instructions Each Dose to Equal  
 Morning Noon Evening Bedtime  
 divalproex  mg tablet Commonly known as:  DEPAKOTE Your last dose was: Your next dose is: Take 1 Tab by mouth two (2) times a day. Indications: schizophrenia 500 mg CHANGE how you take these medications Instructions Each Dose to Equal  
 Morning Noon Evening Bedtime  
 benztropine 2 mg tablet Commonly known as:  COGENTIN What changed:   
- medication strength 
- how much to take - when to take this Your last dose was: Your next dose is: Take 1 Tab by mouth three (3) times daily. Indications: drug-induced extrapyramidal reaction 2 mg CONTINUE taking these medications Instructions Each Dose to Equal  
 Morning Noon Evening Bedtime  
 aspirin delayed-release 81 mg tablet Your last dose was: Your next dose is: Take 81 mg by mouth daily as needed for Pain. 81 mg  
    
   
   
   
  
 ibuprofen 200 mg tablet Commonly known as:  MOTRIN Your last dose was: Your next dose is: Take 200 mg by mouth every four (4) hours as needed for Pain. 200 mg  
    
   
   
   
  
  
STOP taking these medications BENADRYL 25 mg capsule Generic drug:  diphenhydrAMINE  
   
  
 zolpidem 5 mg tablet Commonly known as:  AMBIEN Where to Get Your Medications Information on where to get these meds will be given to you by the nurse or doctor. ! Ask your nurse or doctor about these medications  
  benztropine 2 mg tablet  
 divalproex  mg tablet Discharge Instructions Psychosis: Care Instructions Your Care Instructions A person with psychosis cannot tell the difference between what is real and what is not real. It can cause strange thoughts and behaviors. A person with psychosis may have: · Delusions. These are beliefs that are not real. 
· Hallucinations. These are things that the person sees or hears that are not really there. · Personality changes. Psychosis can be treated with medicines and counseling. It is important to take your medicines exactly as prescribed, even when you feel well. You will need ongoing follow-up care and may need lifelong treatment. When psychosis is not treated, the risks are higher for suicide, a hospital stay, and other problems. Early treatment called coordinated specialty care [facility]) may help a person who is having his or her first episode of psychotic thoughts. Ask your doctor about [address]. Follow-up care is a key part of your treatment and safety.  Be sure to make and go to all appointments, and call your doctor if you are having problems. It's also a good idea to know your test results and keep a list of the medicines you take. How can you care for yourself at home? · Be safe with medicines. Take your medicines exactly as prescribed. Call your doctor if you think you are having a problem with your medicine. You will get more details on the specific medicines your doctor prescribes. · Go to your counseling sessions and follow-up appointments. · Join a self-help or support group. These groups can be very helpful for some people with psychosis. · Get at least 30 minutes of exercise on most days of the week. Walking is a good choice. You also may want to do other activities, such as running, swimming, cycling, or playing tennis or team sports. · Get enough sleep. · Eat a healthy, balanced diet. It includes whole grains, dairy, fruits and vegetables, and protein. Eat a variety of foods from each of those groups. This will get you all the nutrients you need. · Avoid alcohol and drugs. · Keep the numbers for these national suicide hotlines: 1-519-137-TALK (8-792.905.9804) and 2-465-CPHRZTJ (2-354.292.6290). If you or someone you know talks about suicide or about feeling hopeless, get help right away. For the caregiver If you are caring for someone with psychosis, it is important that you take care of yourself as well. · Learn about psychosis. Know the first signs that symptoms are getting worse. · Make a plan with all family members about how to take care of your loved one when his or her symptoms are bad. · Talk about your fears and concerns and those of other family members. · Seek counseling if you need to. · Know your legal rights and the legal rights of your family member or loved one. · Take care of yourself. Stay involved with your own interests, such as your career, hobbies, and friends.  Try things like exercise, positive self-talk, relaxation, and deep breathing to help manage your stress. · Give yourself time to grieve. You may need to deal with emotions such as anger, fear, and frustration. After you work through your feelings, you will be better able to care for yourself and your family. When should you call for help? Call 911 anytime you think you may need emergency care. For example, call if: 
  · You feel you cannot stop from hurting yourself or someone else.  
  · Someone who has psychosis displays dangerous behavior, and you think the person might hurt himself or herself or someone else.  
[facility] your doctor now or seek immediate medical care if: 
  · A person with psychosis mentions suicide. If a suicide threat seems real, with a specific plan and a way to carry it out, you should stay with the person, or ask someone you trust to stay with the person, until you get help.  
  · A person who has psychosis: 
¨ Starts to give away his or her possessions. ¨ Uses illegal drugs or drinks alcohol heavily. ¨ Talks or writes about death, including writing suicide notes and talking about guns, knives, or pills. ¨ Starts to spend a lot of time alone. ¨ Acts very aggressively or suddenly appears calm.  
  · You hear voices or think you see things that are not there.  
  · You have a sudden change in behavior.  
  · You have difficulty taking care of yourself or become confused doing simple chores or tasks.  
 Watch closely for changes in your health, and be sure to contact your doctor if: 
  · Your symptoms repeatedly upset your daily activities.  
  · You have symptoms of psychosis that are new or different from those you had before. Where can you learn more? Go to http://gualberto-evaristo.info/. Enter D547 in the search box to learn more about \"Psychosis: Care Instructions. \" Current as of: December 7, 2017 Content Version: 11.8 © 3712-3578 Healthwise, Incorporated.  Care instructions adapted under license by [employer] (which disclaims liability or warranty for this information). If you have questions about a medical condition or this instruction, always ask your healthcare professional. [address] any warranty or liability for your use of this information. DISCHARGE SUMMARY from Nurse PATIENT INSTRUCTIONS: 
 
 
[employer]   [phone] [address] [address] [phone] [place] [address]  [phone] [phone] [employer]  [phone] 0 [facility]  [phone] [facility]  [phone] These are general instructions for a healthy lifestyle: No smoking/ No tobacco products/ Avoid exposure to second hand smoke Surgeon General's Warning:  Quitting smoking now greatly reduces serious risk to your health. Obesity, smoking, and sedentary lifestyle greatly increases your risk for illness A healthy diet, regular physical exercise & weight monitoring are important for maintaining a healthy lifestyle You may be retaining fluid if you have a history of heart failure or if you experience any of the following symptoms:  Weight gain of 3 pounds or more overnight or 5 pounds in a week, increased swelling in our hands or feet or shortness of breath while lying flat in bed. Please call your doctor as soon as you notice any of these symptoms; do not wait until your next office visit. Recognize signs and symptoms of STROKE: 
 
F-face looks uneven A-arms unable to move or move unevenly S-speech slurred or non-existent T-time-call 911 as soon as signs and symptoms begin-DO NOT go Back to bed or wait to see if you get better-TIME IS BRAIN. Warning Signs of HEART ATTACK Call 911 if you have these symptoms: 
? Chest discomfort.  Most heart attacks involve discomfort in the center of the chest that lasts more than a few minutes, or that goes away and comes back. It can feel like uncomfortable pressure, squeezing, fullness, or pain. ? Discomfort in other areas of the upper body. Symptoms can include pain or discomfort in one or both arms, the back, neck, jaw, or stomach. ? Shortness of breath with or without chest discomfort. ? Other signs may include breaking out in a cold sweat, nausea, or lightheadedness. Don't wait more than five minutes to call [address]! Fast action can save your life. Calling 911 is almost always the fastest way to get lifesaving treatment. Emergency Medical Services staff can begin treatment when they arrive  up to an hour sooner than if someone gets to the hospital by car. The discharge information has been reviewed with the patient. The patient verbalized understanding. Discharge medications reviewed with the patient and appropriate educational materials and side effects teaching were provided. CollabNet Activation Thank you for requesting access to CollabNet. Please follow the instructions below to securely access and download your online medical record. CollabNet allows you to send messages to your doctor, view your test results, renew your prescriptions, schedule appointments, and more. How Do I Sign Up? 1. In your internet browser, go to [email or web address] 
2. Click on the First Time User? Click Here link in the Sign In box. You will be redirect to the New Member Sign Up page. 3. Enter your CollabNet Access Code exactly as it appears below. You will not need to use this code after youve completed the sign-up process. If you do not sign up before the expiration date, you must request a new code. CollabNet Access Code: [email or web address] Expires: 2018 10:47 AM (This is the date your CollabNet access code will ) 4. Enter the last four digits of your Social Security Number (xxxx) and Date of Birth (mm/dd/yyyy) as indicated and click Submit. You will be taken to the next sign-up page. 5. Create a Leot ID. This will be your Newscron login ID and cannot be changed, so think of one that is secure and easy to remember. 6. Create a Newscron password. You can change your password at any time. 7. Enter your Password Reset Question and Answer. This can be used at a later time if you forget your password. 8. Enter your e-mail address. You will receive e-mail notification when new information is available in [facility] 9. Click Sign Up. You can now view and download portions of your medical record. 10. Click the Download Summary menu link to download a portable copy of your medical information. Additional Information If you have questions, please visit the Frequently Asked Questions section of the Newscron website at [email or web address] Remember, Newscron is NOT to be used for urgent needs. For medical emergencies, dial 911. 
 
 
___________________________________________________________________________________________________________________________________ Introducing [facility]! [facility] introduces Newscron patient portal. Now you can access parts of your medical record, email your doctor's office, and request medication refills online. 1. In your internet browser, go to [email or web address] 2. Click on the First Time User? Click Here link in the Sign In box. You will see the New Member Sign Up page. 3. Enter your Newscron Access Code exactly as it appears below. You will not need to use this code after youve completed the sign-up process. If you do not sign up before the expiration date, you must request a new code. · Newscron Access Code: [email or web address] Expires: 12/25/2018 10:47 AM 
 
4. Enter the last four digits of your Social Security Number (xxxx) and Date of Birth (mm/dd/yyyy) as indicated and click Submit. You will be taken to the next sign-up page. 5. Create a Tilt ID. This will be your Tilt login ID and cannot be changed, so think of one that is secure and easy to remember. 6. Create a Tilt password. You can change your password at any time. 7. Enter your Password Reset Question and Answer. This can be used at a later time if you forget your password. 8. Enter your e-mail address. You will receive e-mail notification when new information is available in [facility] 9. Click Sign Up. You can now view and download portions of your medical record. 10. Click the Download Summary menu link to download a portable copy of your medical information. If you have questions, please visit the Frequently Asked Questions section of the Tilt website. Remember, Tilt is NOT to be used for urgent needs. For medical emergencies, dial 911. Now available from your iPhone and Android! Introducing Adrian Reilly As a [facility] patient, I wanted to make you aware of our electronic visit tool called Adrian Robertokalyanawilda. [facility] 24/7 allows you to connect within minutes with a medical provider 24 hours a day, seven days a week via a mobile device or tablet or logging into a secure website from your computer. You can access Adrian Reilly from anywhere in the [place]. A virtual visit might be right for you when you have a simple condition and feel like you just dont want to get out of bed, or cant get away from work for an appointment, when your regular [facility] provider is not available (evenings, weekends or holidays), or when youre out of town and need minor care. Electronic visits cost only $49 and if the [facility] 24/7 provider determines a prescription is needed to treat your condition, one can be electronically transmitted to a nearby pharmacy*. Please take a moment to enroll today if you have not already done so. The enrollment process is free and takes just a few minutes.   To enroll, please download the [employer] 24/7 mhiir to your tablet or phone, or visit [email or web address] to enroll on your computer. And, as an [address] [place] [address] patient with a [email or web address] account, the results of your visits will be scanned into your electronic medical record and your primary care provider will be able to view the scanned results. We urge you to continue to see your regular [employer] provider for your ongoing medical care. And while your primary care provider may not be the one available when you seek a [facility] virtual visit, the peace of mind you get from getting a real diagnosis real time can be priceless. For more information on [facility], view our Frequently Asked Questions (FAQs) at [email or web address]. Sincerely, 
 
Eulalia Ware MD 
Chief Medical Officer Monique Andrade *:  certain medications cannot be prescribed via [facility] Providers Seen During Your Hospitalization Provider Specialty Primary office phone Delmi Acevedo MD Psychiatry [phone] Eva Edwards MD Psychiatry [phone] Immunizations Administered for This Admission Name Date Influenza Vaccine (Quad) PF 9/29/2018 Your Primary Care Physician (PCP) Primary Care Physician Office Phone Office Fax Calli Hernandez [phone] You are allergic to the following No active allergies Recent Documentation OB Status Smoking Status Implant Current Every Day Smoker Emergency Contacts Name Discharge Info Relation Home Work Mobile [id number] CAREGIVER [3] Mother [14] [phone] Patient Belongings  The following personal items are in your possession at time of discharge: 
  Dental Appliances: None  Visual Aid: Glasses, With patient      Home Medications: None   Jewelry: None  Clothing: Shirt, Undergarments, Pants Other Valuables: Cell Phone, Purse, Money (comment), Wallet  Personal Items Sent to Safe: (P)  Cell Phone;Purse;Money (comment); Robbie Book Please provide this summary of care documentation to your next provider. Signatures-by signing, you are acknowledging that this After Visit Summary has been reviewed with you and you have received a copy. Patient Signature:  ____________________________________________________________ Date:  ____________________________________________________________  
  
[facility] Provider Signature:  ____________________________________________________________ Date:  ____________________________________________________________

## 2018-09-29 NOTE — PROGRESS NOTES
* No surgery found * 
* No surgery found * Bedside and Verbal shift change report given to Carolyne (oncoming nurse) by Aime Mrudock RN (offgoing nurse). Report included the following information SBAR, Kardex, Recent Results, Med Rec Status and Cardiac Rhythm SR/SB. Zone Phone:   [phone] Significant changes during shift:  None Patient Information Michelle Sanders 43 y.o. 
9/26/2018 10:46 AM by Noreen Abbasi MD. Michelle Sanders was admitted from Home 
 
Problem List 
 
Patient Active Problem List  
 Diagnosis Date Noted  Dystonia 09/26/2018  UTI (urinary tract infection) 09/26/2018  Chronic pain 09/26/2018  Psychiatric illness 09/26/2018  ADHD (attention deficit hyperactivity disorder) 02/27/2013  Low back pain 07/12/2012  Fibromyalgia 07/12/2012  Ketonuria 07/12/2012  Proteinuria 07/12/2012 Past Medical History:  
Diagnosis Date  Attention and concentration deficit  [facility]  Chronic fatigue  Chronic pain  Fibromyalgia 6 years ago Dr. Mika Kee  Hearing voices Dr. Kathe Horan  Hx of depression headache  Post herpetic neuralgia  Schizophrenia ([facility]  Shingles 2 weeks ago Core Measures: CVA: No No 
CHF:No No 
PNA:No No 
 
Post Op Surgical (If Applicable):  
 
Number times ambulated in hallway past shift:  0 Number of times OOB to chair past shift:   0 
NG Tube: No 
Incentive Spirometer: No 
Drains: No   Volume  0 Dressing Present:  No 
Flatus:  Not applicable Activity Status: OOB to Chair yes Ambulated this shift yes Bed Rest No 
 
Supplemental O2: (If Applicable) NC No 
NRB No 
Venti-mask No 
On 0 Liters/min LINES AND DRAINS: 
 
Central Line? No  
PICC LINE? No  
Urinary Catheter? No  
DVT prophylaxis: DVT prophylaxis Med- Yes DVT prophylaxis SCD or MEI- No  
 
Wounds: (If Applicable) Wounds- No 
 
Location 0 Patient Safety: 
 
Falls Score Total Score: 4 Safety Level_______ Bed Alarm On? Yes Sitter? No 
 
Plan for upcoming shift:  Safety, pain meds Discharge Plan: yes inpatient psychiatry Active Consults: 
IP CONSULT TO PSYCHIATRY

## 2018-09-29 NOTE — BH NOTES
ADMISSION NOTE: 
 
Pt was admitted to the unit voluntarily for th professional services of Dr. Linette Torres. Pt was medically cleared at [facility] and admitted to the med floor for dystonia due to Haldol Injection? Per report, pt presented paranoid to ED and has had several prior psychiatric admissions. Pt has a medical hx of chronic pain, fibromyalgia and ADHD. PT was treated with Rocephin for a UTI. Pt denies using any medications other than her Haldol injections. Pt UDS and BAL are negative. Pt skin assessment was unremarkable. Orders received from Dr. Mary Oliveros. Pt oriented to the unit and received dinner. Will monitor pt q15 minutes.

## 2018-09-29 NOTE — PROGRESS NOTES
TRANSFER - OUT REPORT: 
 
Verbal report given to Christiano Blakely (name) on Christina Auguste  being transferred to [facility] for routine progression of care Report consisted of patients Situation, Background, Assessment and  
Recommendations(SBAR). Information from the following report(s) SBAR, Kardex, Intake/Output and MAR was reviewed with the receiving nurse. Opportunity for questions and clarification was provided. Patient transported with: ASMITA

## 2018-09-29 NOTE — DISCHARGE SUMMARY
Hospitalist Discharge Summary     Patient ID:  Argenis Alanis  [id number]  56 y.o.  1980    PCP on record: Dick Cazares MD    Admit date: 9/26/2018  Discharge date and time: 9/29/2018      DISCHARGE DIAGNOSIS:    Difficulty moving all 4 extremities POA- atypical symptoms on Haldol maintenance therapy, cleared by PT/OT for [place] this admission  Suspect dystonia from haldol?- started on Cogentin BID as per Dr Rao Lazaro recommendations  Psychiatric illness/schizophrenia POA- was started on haldol monthly shots when she was DC from [facility] 1 month ago- now stopped for now, IP Psych recommended for meds optimization per Psychiatry evaluation- Dr Rosie Mcginnis  Fibromyalgia POA  Chronic pain syndrome POA- avoid opioids  Probable UTI (urinary tract infection) POA- ruled out with neg Cx (coag neg staph- likely contaminant)      CONSULTATIONS:  IP CONSULT TO PSYCHIATRY    Excerpted HPI from H&P of Analisa Temple MD:  Dorothy.Bundy y.o.  female who presents with difficulty moving all 4 extremities. As per patient, this all started a month ago after haldol and she didn't take her haldol this month as she believe this is all related to haldol. Pt reported going to ED at [address] [place] [address] Drive read in [facility] and after negative workup, sent home. He weakness continues and she called EMS and came to [facility] ED for evaluation. Pt denies any fever, chills, nausea, vomiting, diarrhea, chest pain, cough, problems urination. She also reports pain in her legs which is been there for long time. She showed me paper work from [facility] and over there CT head and UDS was negative.      We were asked to admit for work up and evaluation of the above problems. \"    ______________________________________________________________________  DISCHARGE SUMMARY/HOSPITAL COURSE:  for full details see H&P, daily progress notes, labs, consult notes.      Difficulty moving all 4 extremities POA- atypical symptoms on Haldol maintenance therapy  Suspect dystonia from haldol  Pt takes month haldol injections and claims symptoms started after the dose of haldol that she received after the dose of haldol and she didn't took recent dose considering she was concern that she had a side affect from the haldol  Long acting half life is 25 days so she should still have > 25% in her system and likely reason not getting better  This is the 2nd ED visit  For the same for worsening symptoms  UDS, pregnancy test and CT head negative on 9/12 at outside ED facility (I reviewed paper work that she has with her)     s/p 1mg IM Benztropin x 1 in ER, s/p 2nd dose yesterday AM  Will monitor on neurotele  IP Psych consult by Dr Weaver appreciated- started on Cogentin BID  CM consulted for DC planning- will need IP psych transfer as per Dr Weaver (Psych)      Fibromyalgia POA  Chronic pain syndrome POA  Avoid narcotic  PRN tylenol and motrin for now      Probable UTI (urinary tract infection) POA- ruled out with neg Cx (coag neg staph- likely contaminant)  s/p IV rocephin - DC today         Psychiatric illness/schizophrenia- is on haldol monthly shots  Psych to see the patient as above        _______________________________________________________________________  Patient seen and examined by me on discharge day. Pertinent Findings:  Gen:    Not in distress  Chest: Clear lungs  CVS:   Regular rhythm. No edema  Abd:  Soft, not distended, not tender  Neuro:  Alert, oriented x 3  _______________________________________________________________________  DISCHARGE MEDICATIONS:   Current Discharge Medication List      START taking these medications    Details   benztropine (COGENTIN) 1 mg tablet Take 1 Tab by mouth two (2) times a day. Qty: 60 Tab, Refills: 0      zolpidem (AMBIEN) 5 mg tablet Take 1 Tab by mouth nightly as needed for Sleep. Max Daily Amount: 5 mg.   Qty: 30 Tab, Refills: 0    Associated Diagnoses: Psychiatric illness         CONTINUE these medications which have NOT CHANGED    Details   diphenhydrAMINE (BENADRYL) 25 mg capsule Take  mg by mouth nightly. aspirin delayed-release 81 mg tablet Take 81 mg by mouth daily as needed for Pain. ibuprofen (MOTRIN) 200 mg tablet Take 200 mg by mouth every four (4) hours as needed for Pain. My Recommended Diet, Activity, Wound Care, and follow-up labs are listed in the patient's Discharge Insturctions which I have personally completed and reviewed.     ______________________________________________________________________    Risk of deterioration: Low    Condition at Discharge:  Stable  ______________________________________________________________________    Disposition  Ip Psych unit at [facility]  ______________________________________________________________________    Care Plan discussed with:   Patient, RN, Care Manager, Consultant    ______________________________________________________________________    Code Status: Full Code  ______________________________________________________________________      Follow up with:   PCP : Mauricio Chan MD  Follow-up Information     Follow up With Details [address] Medicaid Transportation   CALL THIS NUMBER 3 DAYS PRIOR TO APPOINTMENTS FOR TRANSPORTATION TO [phone]     Alfredito Velasco MD Go on 10/4/2018 Please follow up on October 4, 2018 1:00 arriving at 12:30 to register as a new patient with photo ID, insurance card and current list of medication  [address]  [address] [facility] [address]  [phone]      Mauricio Chan, [address] [facility] [address] [place] [address]  [phone]                Total time in minutes spent coordinating this discharge (includes going over instructions, follow-up, prescriptions, and preparing report for sign off to her PCP) :  36 minutes    Signed:  Fransisco Reed MD

## 2018-09-29 NOTE — IP AVS SNAPSHOT
[facility] 
 
 
 [address] Neal Patient: [place] MRN: [id number] A check wiliam indicates which time of day the medication should be taken. My Medications START taking these medications Instructions Each Dose to Equal  
 Morning Noon Evening Bedtime  
 divalproex  mg tablet Commonly known as:  DEPAKOTE Your last dose was: Your next dose is: Take 1 Tab by mouth two (2) times a day. Indications: schizophrenia 500 mg CHANGE how you take these medications Instructions Each Dose to Equal  
 Morning Noon Evening Bedtime  
 benztropine 2 mg tablet Commonly known as:  COGENTIN What changed:   
- medication strength 
- how much to take - when to take this Your last dose was: Your next dose is: Take 1 Tab by mouth three (3) times daily. Indications: drug-induced extrapyramidal reaction 2 mg CONTINUE taking these medications Instructions Each Dose to Equal  
 Morning Noon Evening Bedtime  
 aspirin delayed-release 81 mg tablet Your last dose was: Your next dose is: Take 81 mg by mouth daily as needed for Pain. 81 mg  
    
   
   
   
  
 ibuprofen 200 mg tablet Commonly known as:  MOTRIN Your last dose was: Your next dose is: Take 200 mg by mouth every four (4) hours as needed for Pain. 200 mg  
    
   
   
   
  
  
STOP taking these medications BENADRYL 25 mg capsule Generic drug:  diphenhydrAMINE  
   
  
 zolpidem 5 mg tablet Commonly known as:  AMBIEN Where to Get Your Medications Information on where to get these meds will be given to you by the nurse or doctor. ! Ask your nurse or doctor about these medications  
  benztropine 2 mg tablet  
 divalproex  mg tablet

## 2018-09-29 NOTE — PROGRESS NOTES
CM reviewed medical record, followed up re: transitional care plans, pending inpatient psychiatry transfer admission. CM spoke with RN for update re: medical status. CM called and spoke with bed placement [phone], provided updated referral information, was advised Patient's referral under review and CM will be notified re: outcome/recommendations. CM will continue to follow/assist re: transfer admission as indicated. Nicole Glass LCSW [facility] 
 
12:15PM  SW received incoming call from Patient accepted for transfer admission to [facility], Room # 237-3, Nursing to arrange transport and call report to [phone]. CM remains available to assist w/ d/c as indicated.

## 2018-09-30 PROBLEM — F29 PSYCHOSIS (HCC): Chronic | Status: ACTIVE | Noted: 2018-09-26

## 2018-09-30 PROBLEM — G25.9 EXTRAPYRAMIDAL AND MOVEMENT DISORDER: Status: ACTIVE | Noted: 2018-09-30

## 2018-09-30 LAB
CHOLEST SERPL-MCNC: 107 MG/DL
GLUCOSE P FAST SERPL-MCNC: 97 MG/DL (ref 65–100)
HDLC SERPL-MCNC: 49 MG/DL
HDLC SERPL: 2.2 {RATIO} (ref 0–5)
LDLC SERPL CALC-MCNC: 44.4 MG/DL (ref 0–100)
LIPID PROFILE,FLP: NORMAL
TRIGL SERPL-MCNC: 68 MG/DL (ref ?–150)
TSH SERPL DL<=0.05 MIU/L-ACNC: 1.21 UIU/ML (ref 0.36–3.74)
VLDLC SERPL CALC-MCNC: 13.6 MG/DL

## 2018-09-30 PROCEDURE — 80061 LIPID PANEL: CPT | Performed by: PSYCHIATRY & NEUROLOGY

## 2018-09-30 PROCEDURE — 36415 COLL VENOUS BLD VENIPUNCTURE: CPT | Performed by: PSYCHIATRY & NEUROLOGY

## 2018-09-30 PROCEDURE — 82947 ASSAY GLUCOSE BLOOD QUANT: CPT | Performed by: PSYCHIATRY & NEUROLOGY

## 2018-09-30 PROCEDURE — 65220000003 HC RM SEMIPRIVATE PSYCH

## 2018-09-30 PROCEDURE — 74011250637 HC RX REV CODE- 250/637: Performed by: PSYCHIATRY & NEUROLOGY

## 2018-09-30 PROCEDURE — 84443 ASSAY THYROID STIM HORMONE: CPT | Performed by: PSYCHIATRY & NEUROLOGY

## 2018-09-30 RX ORDER — DIVALPROEX SODIUM 500 MG/1
500 TABLET, DELAYED RELEASE ORAL 2 TIMES DAILY
Status: DISCONTINUED | OUTPATIENT
Start: 2018-09-30 | End: 2018-10-05 | Stop reason: HOSPADM

## 2018-09-30 RX ORDER — BENZTROPINE MESYLATE 1 MG/1
1 TABLET ORAL 3 TIMES DAILY
Status: DISCONTINUED | OUTPATIENT
Start: 2018-09-30 | End: 2018-10-02

## 2018-09-30 RX ORDER — BENZTROPINE MESYLATE 1 MG/1
1 TABLET ORAL 3 TIMES DAILY
Status: DISCONTINUED | OUTPATIENT
Start: 2018-09-30 | End: 2018-09-30

## 2018-09-30 RX ADMIN — DIVALPROEX SODIUM 500 MG: 500 TABLET, DELAYED RELEASE ORAL at 17:10

## 2018-09-30 RX ADMIN — ZOLPIDEM TARTRATE 10 MG: 10 TABLET ORAL at 21:18

## 2018-09-30 RX ADMIN — BENZTROPINE MESYLATE 1 MG: 1 TABLET ORAL at 17:10

## 2018-09-30 RX ADMIN — DIVALPROEX SODIUM 500 MG: 500 TABLET, DELAYED RELEASE ORAL at 21:18

## 2018-09-30 RX ADMIN — BENZTROPINE MESYLATE 1 MG: 1 TABLET ORAL at 10:20

## 2018-09-30 RX ADMIN — LORAZEPAM 1 MG: 1 TABLET ORAL at 19:57

## 2018-09-30 NOTE — BH NOTES
Argenis Alanis  was absent from [facility]. Selam Sheldon 9/30/2018 
9:41 AM

## 2018-09-30 NOTE — BH NOTES
Patient was observed to be sleeping for 7 hours without any distress and even respirations. Pt was cooperative with blood drawn for the labs. Q 15 minutes monitoring maintained for the safety and support.

## 2018-09-30 NOTE — PROGRESS NOTES
Problem: Falls - Risk of 
Goal: *Absence of Falls Document Caty Dumont Fall Risk and appropriate interventions in the flowsheet. Outcome: Progressing Towards Goal 
Fall Risk Interventions: 
  
Pt has on non-skid socks Medication Interventions: Teach patient to arise slowly Elimination Interventions: Toilet paper/wipes in reach History of Falls Interventions: Door open when patient unattended Comments: Pt has been isolative to room most of the shift. She and her roommate from earlier had a dispute that made this patient yell several times. Patients were split apart and argument was deescalated. This pt denied SI, HI, or discomfort at this time. She has slept most of the day. No concerns or issues expressed at this time.

## 2018-09-30 NOTE — PROGRESS NOTES
Problem: Paranoid Ideation Goal: *LTG: Interact with others without defensiveness or anger Outcome: Progressing Towards Goal 
Pt is compliant with medications and meals. Staff heard patient screaming in her room and when staff asked pt what she needed, she replied that her roommate was praying too loud. Pt and roommate were assigned new rooms for safety. Pt denies hallucinations and delusions. Pt denies any needs at this time. Will continue to monitor pt q15 minutes.

## 2018-09-30 NOTE — H&P
INITIAL PSYCHIATRIC EVALUATION   
   
 
IDENTIFICATION:   
Patient Name  Mulu Russ Date of Birth 1980 [facility] [id number] Medical Record Number  [id number] Age  43 y.o. PCP Valdemar Alvarez MD  
Admit date:  9/29/2018 Room Number  210/76  @ [facility]  
Date of Service  9/30/2018 HISTORY  
 
   
REASON FOR HOSPITALIZATION: 
CC: \"paranoia\". Pt admitted on a voluntary basis for severe EPS and paranoia HISTORY OF PRESENT ILLNESS:   
The patient, Mulu Russ, is a 43 y.o. WHITE [place]  female with a past psychiatric history significant for opiate dependence, who presents at this time  as a transfer from [facility] where she was admitted for severe EPS and reported paranoia. She was arrested and jailed for a couple of months for drug paraphernalia, then transferred to [facility] forensic unit for psychosis, mood swings, etc. During her hospitalization, she was given Haldol dec monthly x 3. She was released two weeks ago and developed severe EPS- stiffness, rigidity, etc. She went to [facility] and received benadryl and cogentin, improved but noted to have paranoia. Opiate user, not interested in substance abuse treatment. She struggled to explain her symptoms, but she reports having a diagnosis of bipolar disorder. ALLERGIES: No Known Allergies MEDICATIONS PRIOR TO ADMISSION:  
Prescriptions Prior to Admission Medication Sig  
 benztropine (COGENTIN) 1 mg tablet Take 1 Tab by mouth two (2) times a day.  zolpidem (AMBIEN) 5 mg tablet Take 1 Tab by mouth nightly as needed for Sleep. Max Daily Amount: 5 mg.  diphenhydrAMINE (BENADRYL) 25 mg capsule Take  mg by mouth nightly.  aspirin delayed-release 81 mg tablet Take 81 mg by mouth daily as needed for Pain.  ibuprofen (MOTRIN) 200 mg tablet Take 200 mg by mouth every four (4) hours as needed for Pain. PAST MEDICAL HISTORY:  
Past Medical History:  
Diagnosis Date  Attention and concentration deficit  Bipolar disorder ([address]  Chronic fatigue  Chronic pain  Fibromyalgia 6 years ago Dr. Ina Gonzales  Hearing voices Dr. Karen Lawson  Hx of depression headache  Post herpetic neuralgia  Schizophrenia ([facility]  Shingles 2 weeks ago No past surgical history on file. SOCIAL HISTORY: patient lives alone with a roommate, she has two children ages 15 and 13 yo, but grandparents have custody. Recent [facility] time for drug possession. Social History Social History  Marital status: SINGLE Spouse name: N/A  
 Number of children: N/A  
 Years of education: N/A Occupational History  Not on file. Social History Main Topics  Smoking status: Current Every Day Smoker Packs/day: 1.00 Years: 10.00  Smokeless tobacco: Never Used  Alcohol use Not on file Comment: not really  Drug use: No  
 Sexual activity: No  
 
Other Topics Concern  Not on file Social History Narrative Has 2 children FAMILY HISTORY: History reviewed. No pertinent family history. Family History Problem Relation Age of Onset  Diabetes Mother  Thyroid Disease Mother  COPD Mother  Stroke Paternal Grandfather  Heart Attack Father At age 39  Bipolar Disorder Father REVIEW OF SYSTEMS:  
Negative except muscle stiffness, rigidity, constricted affect, mood lability Pertinent items are noted in the History of Present Illness. All other Systems reviewed and are considered negative. [facility] MENTAL STATUS EXAM (MSE): MSE FINDINGS ARE WITHIN NORMAL LIMITS (WNL) UNLESS OTHERWISE STATED BELOW. ( ALL OF THE BELOW CATEGORIES OF THE MSE HAVE BEEN REVIEWED (reviewed 9/30/2018) AND UPDATED AS DEEMED APPROPRIATE ) General Presentation age appropriate and casually dressed, cooperative but limited engagement, limited responses to treatment Orientation oriented to time, place and person Vital Signs  See below (reviewed 9/30/2018); Vital Signs (BP, Pulse, & Temp) are within normal limits if not listed below. Gait and Station Stable/steady, no ataxia Musculoskeletal System No extrapyramidal symptoms (EPS); no abnormal muscular movements or Tardive Dyskinesia (TD); muscle strength and tone are within normal limits Language No aphasia or dysarthria Speech:  hypoverbal and soft Thought Processes logical; slow rate of thoughts; poor abstract reasoning/computation Thought Associations blocked Thought Content paranoid delusions Suicidal Ideations none Homicidal Ideations none Mood:  labile Affect:  labile Memory recent  impaired Memory remote:  fair Concentration/Attention:  distractable Fund of Knowledge avg Insight:  limited Reliability fair Judgment:  fair VITALS:    
Patient Vitals for the past 24 hrs: 
 Temp Pulse Resp BP SpO2  
09/30/18 0711 98.9 °F (37.2 °C) (!) 51 15 93/59 100 % 09/29/18 1613 98.4 °F (36.9 °C) (!) 58 16 103/73 100 % Wt Readings from Last 3 Encounters:  
09/26/18 79.4 kg (175 lb)  
07/22/17 75.6 kg (166 lb 10.7 oz) 10/28/14 74 kg (163 lb 3.2 oz) Temp Readings from Last 3 Encounters:  
09/30/18 98.9 °F (37.2 °C)  
09/29/18 98.2 °F (36.8 °C)  
07/22/17 98.1 °F (36.7 °C) BP Readings from Last 3 Encounters:  
09/30/18 93/59  
09/29/18 120/67  
07/22/17 124/76 Pulse Readings from Last 3 Encounters:  
09/30/18 (!) 51  
09/29/18 60  
07/22/17 72 DATA LABORATORY DATA: 
Labs Reviewed GLUCOSE, FASTING  
TSH 3RD GENERATION  
LIPID PANEL Admission on 09/29/2018 Component Date Value Ref Range Status  Glucose 09/30/2018 97  65 - 100 MG/DL Final  
 TSH 09/30/2018 1.21  0.36 - 3.74 uIU/mL Final  
 LIPID PROFILE 09/30/2018        Final  
 Cholesterol, total 09/30/2018 107  <200 MG/DL Final  
 Triglyceride 09/30/2018 68  <150 MG/DL Final  
  HDL Cholesterol 09/30/2018 49  MG/DL Final  
 LDL, calculated 09/30/2018 44.4  0 - 100 MG/DL Final  
 VLDL, calculated 09/30/2018 13.6  MG/DL Final  
 CHOL/HDL Ratio 09/30/2018 2.2  0 - 5.0   Final  
Admission on 09/26/2018, Discharged on 09/29/2018 Component Date Value Ref Range Status  Ventricular Rate 09/26/2018 83  BPM Final  
 Atrial Rate 09/26/2018 85  BPM Final  
 QRS Duration 09/26/2018 62  ms Final  
 Q-T Interval 09/26/2018 348  ms Final  
 QTC Calculation (Bezet) 09/26/2018 408  ms Final  
 Calculated R Axis 09/26/2018 -17  degrees Final  
 Calculated T Axis 09/26/2018 -27  degrees Final  
 Diagnosis 09/26/2018    Final  
                 Value:Sinus rhythm No previous ECGs available Confirmed by Dorita Dupont ([id number]) on 9/26/2018 2:39:37 PM 
  
 WBC 09/26/2018 10.1  3.6 - 11.0 K/uL Final  
 RBC 09/26/2018 4.41  3.80 - 5.20 M/uL Final  
 HGB 09/26/2018 14.2  11.5 - 16.0 g/dL Final  
 HCT 09/26/2018 40.3  35.0 - 47.0 % Final  
 MCV 09/26/2018 91.4  80.0 - 99.0 FL Final  
 MCH 09/26/2018 32.2  26.0 - 34.0 PG Final  
 MCHC 09/26/2018 35.2  30.0 - 36.5 g/dL Final  
 RDW 09/26/2018 12.1  11.5 - 14.5 % Final  
 PLATELET 74/79/4112 729  150 - 400 K/uL Final  
 MPV 09/26/2018 10.4  8.9 - 12.9 FL Final  
 NRBC 09/26/2018 0.0  0  WBC Final  
 ABSOLUTE NRBC 09/26/2018 0.00  0.00 - 0.01 K/uL Final  
 NEUTROPHILS 09/26/2018 76* 32 - 75 % Final  
 LYMPHOCYTES 09/26/2018 16  12 - 49 % Final  
 MONOCYTES 09/26/2018 7  5 - 13 % Final  
 EOSINOPHILS 09/26/2018 0  0 - 7 % Final  
 BASOPHILS 09/26/2018 0  0 - 1 % Final  
 IMMATURE GRANULOCYTES 09/26/2018 0  0.0 - 0.5 % Final  
 ABS. NEUTROPHILS 09/26/2018 7.7  1.8 - 8.0 K/UL Final  
 ABS. LYMPHOCYTES 09/26/2018 1.6  0.8 - 3.5 K/UL Final  
 ABS. MONOCYTES 09/26/2018 0.7  0.0 - 1.0 K/UL Final  
 ABS. EOSINOPHILS 09/26/2018 0.0  0.0 - 0.4 K/UL Final  
 ABS.  BASOPHILS 09/26/2018 0.0  0.0 - 0.1 K/UL Final  
  ABS. IMM. GRANS. 09/26/2018 0.0  0.00 - 0.04 K/UL Final  
 DF 09/26/2018 AUTOMATED    Final  
 Sodium 09/26/2018 137  136 - 145 mmol/L Final  
 Potassium 09/26/2018 3.6  3.5 - 5.1 mmol/L Final  
 Chloride 09/26/2018 99  97 - 108 mmol/L Final  
 CO2 09/26/2018 26  21 - 32 mmol/L Final  
 Anion gap 09/26/2018 12  5 - 15 mmol/L Final  
 Glucose 09/26/2018 97  65 - 100 mg/dL Final  
 BUN 09/26/2018 9  6 - 20 MG/DL Final  
 Creatinine 09/26/2018 1.06* 0.55 - 1.02 MG/DL Final  
 BUN/Creatinine ratio 09/26/2018 8* 12 - 20   Final  
 GFR est AA 09/26/2018 >60  >60 ml/min/1.73m2 Final  
 GFR est non-AA 09/26/2018 58* >60 ml/min/1.73m2 Final  
 Calcium 09/26/2018 9.7  8.5 - 10.1 MG/DL Final  
 Bilirubin, total 09/26/2018 0.9  0.2 - 1.0 MG/DL Final  
 ALT (SGPT) 09/26/2018 49  12 - 78 U/L Final  
 AST (SGOT) 09/26/2018 48* 15 - 37 U/L Final  
 Alk.  phosphatase 09/26/2018 52  45 - 117 U/L Final  
 Protein, total 09/26/2018 8.1  6.4 - 8.2 g/dL Final  
 Albumin 09/26/2018 4.5  3.5 - 5.0 g/dL Final  
 Globulin 09/26/2018 3.6  2.0 - 4.0 g/dL Final  
 A-G Ratio 09/26/2018 1.3  1.1 - 2.2   Final  
 Magnesium 09/26/2018 2.1  1.6 - 2.4 mg/dL Final  
 Color 09/26/2018 DARK YELLOW    Final  
 Appearance 09/26/2018 CLOUDY* CLEAR   Final  
 Specific gravity 09/26/2018 1.021  1.003 - 1.030   Final  
 pH (UA) 09/26/2018 6.0  5.0 - 8.0   Final  
 Protein 09/26/2018 TRACE* NEG mg/dL Final  
 Glucose 09/26/2018 NEGATIVE   NEG mg/dL Final  
 Ketone 09/26/2018 40* NEG mg/dL Final  
 Blood 09/26/2018 NEGATIVE   NEG   Final  
 Urobilinogen 09/26/2018 1.0  0.2 - 1.0 EU/dL Final  
 Nitrites 09/26/2018 POSITIVE* NEG   Final  
 Leukocyte Esterase 09/26/2018 SMALL* NEG   Final  
 WBC 09/26/2018 5-10  0 - 4 /hpf Final  
 RBC 09/26/2018 0-5  0 - 5 /hpf Final  
 Epithelial cells 09/26/2018 MODERATE* FEW /lpf Final  
 Bacteria 09/26/2018 3+* NEG /hpf Final  
  UA:UC IF INDICATED 09/26/2018 URINE CULTURE ORDERED* CNI   Final  
 Mucus 09/26/2018 TRACE* NEG /lpf Final  
 CA Oxalate crystals 09/26/2018 FEW* NEG   Final  
 Bilirubin UA, confirm 09/26/2018 NEGATIVE   NEG   Final  
 Pregnancy test,urine (POC) 09/26/2018 NEGATIVE   NEG   Final  
 Special Requests: 09/26/2018     Final  
                 Value:NO SPECIAL REQUESTS Reflexed from [id number]  [place] Count 09/26/2018     Final  
                 Value:>100,000 COLONIES/mL  Culture result: 09/26/2018 STAPHYLOCOCCUS EPIDERMIDIS*   Final  
 Sodium 09/27/2018 137  136 - 145 mmol/L Final  
 Potassium 09/27/2018 3.5  3.5 - 5.1 mmol/L Final  
 Chloride 09/27/2018 103  97 - 108 mmol/L Final  
 CO2 09/27/2018 25  21 - 32 mmol/L Final  
 Anion gap 09/27/2018 9  5 - 15 mmol/L Final  
 Glucose 09/27/2018 94  65 - 100 mg/dL Final  
 BUN 09/27/2018 10  6 - 20 MG/DL Final  
 Creatinine 09/27/2018 1.00  0.55 - 1.02 MG/DL Final  
 BUN/Creatinine ratio 09/27/2018 10* 12 - 20   Final  
 GFR est AA 09/27/2018 >60  >60 ml/min/1.73m2 Final  
 GFR est non-AA 09/27/2018 >60  >60 ml/min/1.73m2 Final  
 Calcium 09/27/2018 8.9  8.5 - 10.1 MG/DL Final  
 Bilirubin, total 09/27/2018 0.8  0.2 - 1.0 MG/DL Final  
 ALT (SGPT) 09/27/2018 45  12 - 78 U/L Final  
 AST (SGOT) 09/27/2018 70* 15 - 37 U/L Final  
 Alk.  phosphatase 09/27/2018 47  45 - 117 U/L Final  
 Protein, total 09/27/2018 6.9  6.4 - 8.2 g/dL Final  
 Albumin 09/27/2018 3.8  3.5 - 5.0 g/dL Final  
 Globulin 09/27/2018 3.1  2.0 - 4.0 g/dL Final  
 A-G Ratio 09/27/2018 1.2  1.1 - 2.2   Final  
 WBC 09/27/2018 8.4  3.6 - 11.0 K/uL Final  
 RBC 09/27/2018 4.06  3.80 - 5.20 M/uL Final  
 HGB 09/27/2018 12.9  11.5 - 16.0 g/dL Final  
 HCT 09/27/2018 38.1  35.0 - 47.0 % Final  
 MCV 09/27/2018 93.8  80.0 - 99.0 FL Final  
 MCH 09/27/2018 31.8  26.0 - 34.0 PG Final  
 MCHC 09/27/2018 33.9  30.0 - 36.5 g/dL Final  
  RDW 09/27/2018 12.1  11.5 - 14.5 % Final  
 PLATELET 55/73/9862 112  150 - 400 K/uL Final  
 MPV 09/27/2018 10.7  8.9 - 12.9 FL Final  
 NRBC 09/27/2018 0.0  0  WBC Final  
 ABSOLUTE NRBC 09/27/2018 0.00  0.00 - 0.01 K/uL Final  
 NEUTROPHILS 09/27/2018 42  32 - 75 % Final  
 LYMPHOCYTES 09/27/2018 44  12 - 49 % Final  
 MONOCYTES 09/27/2018 9  5 - 13 % Final  
 EOSINOPHILS 09/27/2018 4  0 - 7 % Final  
 BASOPHILS 09/27/2018 1  0 - 1 % Final  
 IMMATURE GRANULOCYTES 09/27/2018 0  0.0 - 0.5 % Final  
 ABS. NEUTROPHILS 09/27/2018 3.6  1.8 - 8.0 K/UL Final  
 ABS. LYMPHOCYTES 09/27/2018 3.7* 0.8 - 3.5 K/UL Final  
 ABS. MONOCYTES 09/27/2018 0.8  0.0 - 1.0 K/UL Final  
 ABS. EOSINOPHILS 09/27/2018 0.3  0.0 - 0.4 K/UL Final  
 ABS. BASOPHILS 09/27/2018 0.1  0.0 - 0.1 K/UL Final  
 ABS. IMM. GRANS. 09/27/2018 0.0  0.00 - 0.04 K/UL Final  
 DF 09/27/2018 AUTOMATED    Final  
 
  
RADIOLOGY REPORTS: 
 
Results from Hospital Encounter encounter on 08/08/12 XR CHEST PA LAT Narrative **Final Report** 
 
 
ICD Codes / Adm. Diagnosis: 877242  [id number] / Chest Pain  Dizziness Examination:  CR CHEST PA AND LATERAL  - [id number] - Aug  8 2012  6:03PM 
Accession No:  [id number] Reason:  eval CP/SOB REPORT: 
Exam:  2 view chest 
 
Indication: Chest pain, shortness of breath PA and lateral views demonstrate normal heart size. There is no acute  
process in the lung fields. The osseous structures are unremarkable. IMPRESSION: No acute process. Signing/Reading Doctor: Latricia Herrmann ([id number]) Amaris Escalona ([id number])  08/08/2012 No results found. MEDICATIONS ALL MEDICATIONS Current Facility-Administered Medications Medication Dose Route Frequency  ziprasidone (GEODON) 20 mg in sterile water (preservative free) 1 mL injection  20 mg IntraMUSCular BID PRN  
 OLANZapine (ZyPREXA) tablet 5 mg  5 mg Oral Q6H PRN  
  benztropine (COGENTIN) tablet 2 mg  2 mg Oral BID PRN  
 benztropine (COGENTIN) injection 2 mg  2 mg IntraMUSCular BID PRN  
 LORazepam (ATIVAN) injection 2 mg  2 mg IntraMUSCular Q4H PRN  
 LORazepam (ATIVAN) tablet 1 mg  1 mg Oral Q4H PRN  
 zolpidem (AMBIEN) tablet 10 mg  10 mg Oral QHS PRN  
 acetaminophen (TYLENOL) tablet 650 mg  650 mg Oral Q4H PRN  
 ibuprofen (MOTRIN) tablet 400 mg  400 mg Oral Q8H PRN  
 magnesium hydroxide (MILK OF MAGNESIA) 400 mg/5 mL oral suspension 30 mL  30 mL Oral DAILY PRN  
 nicotine (NICODERM CQ) 21 mg/24 hr patch 1 Patch  1 Patch TransDERmal DAILY PRN  
  
SCHEDULED MEDICATIONS Current Facility-Administered Medications Medication Dose Route Frequency ASSESSMENT & PLAN The patient, Michelle Culp, is a 43 y.o.  female who presents at this time for treatment of the following diagnoses: 
Patient Active Hospital Problem List: 
 
Bipolar Disorder Assessment: substance induced vs. BPD vs. Personality disorder Plan: Agree with inpatient hospitalization for further stabilization, safety monitoring and medication management Medication management- Cogentin 1mg TID Add depakote 500mg twice daily Provided supportive psychotherapy I will continue to monitor blood levels (Depakote -a drug with a narrow therapeutic index= NTI) and associated labs for drug therapy implemented that require intense monitoring for toxicity as deemed appropriate based on current medication side effects and pharmacodynamically determined drug 1/2 lives. A coordinated, multidisplinary treatment team (includes the nurse, unit pharmcist,  and writer) round was conducted for this initial evaluation with the patient present. The following regarding medications was addressed during rounds with patient:  
the risks and benefits of the proposed medication. The patient was given the opportunity to ask questions.  Informed consent given to the use of the above medications. I will continue to adjust psychiatric and non-psychiatric medications (see above \"medication\" section and orders section for details) as deemed appropriate & based upon diagnoses and response to treatment. I have reviewed admission (and previous/old) labs and medical tests in the EHR and or transferring hospital documents. I will continue to order blood tests/labs and diagnostic tests as deemed appropriate and review results as they become available (see orders for details). I have reviewed old psychiatric and medical records available in the EHR. I Will order additional psychiatric records from other institutions to further elucidate the nature of patient's psychopathology and review once available. I will gather additional collateral information from friends, family and o/p treatment team to further elucidate the nature of patient's psychopathology and baselline level of psychiatric functioning. ESTIMATED LENGTH OF STAY:   
3-5 days STRENGTHS: 
Awareness of Substance abuse issues and Setting and pursuing goals SIGNED:   
Candis Templeton MD 
9/30/2018

## 2018-10-01 PROCEDURE — 65220000003 HC RM SEMIPRIVATE PSYCH

## 2018-10-01 PROCEDURE — 74011250637 HC RX REV CODE- 250/637: Performed by: PSYCHIATRY & NEUROLOGY

## 2018-10-01 RX ADMIN — DIVALPROEX SODIUM 500 MG: 500 TABLET, DELAYED RELEASE ORAL at 09:33

## 2018-10-01 RX ADMIN — BENZTROPINE MESYLATE 1 MG: 1 TABLET ORAL at 12:23

## 2018-10-01 RX ADMIN — DIVALPROEX SODIUM 500 MG: 500 TABLET, DELAYED RELEASE ORAL at 21:57

## 2018-10-01 RX ADMIN — ZOLPIDEM TARTRATE 10 MG: 10 TABLET ORAL at 21:57

## 2018-10-01 RX ADMIN — BENZTROPINE MESYLATE 1 MG: 1 TABLET ORAL at 09:33

## 2018-10-01 RX ADMIN — BENZTROPINE MESYLATE 1 MG: 1 TABLET ORAL at 17:34

## 2018-10-01 NOTE — BH NOTES
GROUP THERAPY PROGRESS NOTE Sherri Solis is participating in [employer]. Group time: 30 minutes Personal goal for participation:  Unit orientation Goal orientation: [place] Group therapy participation: n/a Therapeutic interventions reviewed and discussed: Yes Impression of participation: good

## 2018-10-01 NOTE — BH NOTES
GROUP THERAPY PROGRESS NOTE The patient Vaishali marie 43 y.o. female is participating in [employer] [facility] Group time: 30 minutes Personal goal for participation: To discuss the daily goals Goal orientation: personal 
 
Group therapy participation: passive Therapeutic interventions reviewed and discussed:  Yes Impression of participation: JEFFREY Hernandez 9/30/2018  10:12 PM

## 2018-10-01 NOTE — PROGRESS NOTES
Laboratory monitoring for mood stabilizer and antipsychotics: 
 
Recommended baseline monitoring has been completed based on this patient's current medication regimen. The patient is currently taking the following medication(s):  
Current Facility-Administered Medications Medication Dose Route Frequency  benztropine (COGENTIN) tablet 1 mg  1 mg Oral TID  divalproex DR (DEPAKOTE) tablet 500 mg  500 mg Oral BID Height, Weight, BMI Estimation Estimated body mass index is 29.12 kg/(m^2) as calculated from the following: 
  Height as of 9/26/18: 165.1 cm (65\"). Weight as of 9/26/18: 79.4 kg (175 lb). Renal Function, Hepatic Function and Chemistry CrCl cannot be calculated (Unknown ideal weight.). Lab Results Component Value Date/Time Sodium 137 09/27/2018 03:49 AM  
 Potassium 3.5 09/27/2018 03:49 AM  
 Chloride 103 09/27/2018 03:49 AM  
 CO2 25 09/27/2018 03:49 AM  
 Anion gap 9 09/27/2018 03:49 AM  
 Glucose 97 09/30/2018 04:53 AM  
 BUN 10 09/27/2018 03:49 AM  
 Creatinine 1.00 09/27/2018 03:49 AM  
 BUN/Creatinine ratio 10 (L) 09/27/2018 03:49 AM  
 GFR est AA >60 09/27/2018 03:49 AM  
 GFR est non-AA >60 09/27/2018 03:49 AM  
 Calcium 8.9 09/27/2018 03:49 AM  
 ALT (SGPT) 45 09/27/2018 03:49 AM  
 AST (SGOT) 70 (H) 09/27/2018 03:49 AM  
 Alk. phosphatase 47 09/27/2018 03:49 AM  
 Protein, total 6.9 09/27/2018 03:49 AM  
 Albumin 3.8 09/27/2018 03:49 AM  
 Globulin 3.1 09/27/2018 03:49 AM  
 A-G Ratio 1.2 09/27/2018 03:49 AM  
 Bilirubin, total 0.8 09/27/2018 03:49 AM  
 
 
Lab Results Component Value Date/Time Glucose 97 09/30/2018 04:53 AM  
 
 
Lab Results Component Value Date/Time Hemoglobin A1c 5.4 07/12/2012 10:00 AM  
 
 
Hematology Lab Results Component Value Date/Time WBC 8.4 09/27/2018 03:49 AM  
 HGB 12.9 09/27/2018 03:49 AM  
 HCT 38.1 09/27/2018 03:49 AM  
 PLATELET 781 30/90/9795 03:49 AM  
 MCV 93.8 09/27/2018 03:49 AM  
 
 
Lipids Lab Results Component Value Date/Time Cholesterol, total 107 09/30/2018 04:53 AM  
 HDL Cholesterol 49 09/30/2018 04:53 AM  
 LDL, calculated 44.4 09/30/2018 04:53 AM  
 Triglyceride 68 09/30/2018 04:53 AM  
 CHOL/HDL Ratio 2.2 09/30/2018 04:53 AM  
 
 
Thyroid Function Lab Results Component Value Date/Time TSH 1.21 09/30/2018 04:53 AM  
 
Vitals Visit Vitals  /81  Pulse 80  Temp 97.4 °F (36.3 °C)  Resp 18  SpO2 100% Pregnancy Test 
Lab Results Component Value Date/Time Pregnancy test,urine (POC) NEGATIVE  09/26/2018 02:53 PM  
 
 
Jc Childs, PharmD, BCPS 
[phone] (pharmacy)

## 2018-10-01 NOTE — BH NOTES
GROUP THERAPY PROGRESS NOTE 
  
Patient Barrera Guillory  did not participating in  stress relief interventions group  
  
Group time:  45 minutes 
  
Group therapy participation: pt refused group

## 2018-10-01 NOTE — BH NOTES
PSYCHIATRIC PROGRESS NOTE Patient Name  Chau Cardona Date of Birth 1980 CSN [id number] Medical Record Number  [id number] Age  43 y.o. PCP Hardy Ridley MD  
Admit date:  9/29/2018 Room Number  810/04  @ [facility]  
Date of Service  10/1/2018 PSYCHOTHERAPY SESSION NOTE: 
Length of psychotherapy session: 20 minutes Main condition/diagnosis/issues treated during session today, 10/1/2018 : EPS, mood lability I employed Cognitive Behavioral therapy techniques, Reality-Oriented psychotherapy, as well as supportive psychotherapy in regards to various ongoing psychosocial stressors, including the following: pre-admission and current problems; housing issues;  medical issues; and stress of hospitalization. Interpersonal relationship issues and psychodynamic conflicts explored. Attempts made to alleviate maladaptive patterns. We, also, worked on issues of denial & effects of substance dependency/use Overall, patient is not progressing Treatment Plan Update (reviewed an updated 10/1/2018) : I will modify psychotherapy tx plan by implementing more stress management strategies, building upon cognitive behavioral techniques, increasing coping skills, as well as shoring up psychological defenses). An extended energy and skill set was needed to engage pt in psychotherapy due to some of the following: resistiveness, complexity, negativity, confrontational nature, hostile behaviors, and/or severe abnormalities in thought processes/psychosis resulting in the loss of expressive/receptive language communication skills. E & M PROGRESS NOTE: 
  
 
 
HISTORY  
   
CC:  \"A little better\" HISTORY OF PRESENT ILLNESS/INTERVAL HISTORY:  (reviewed/updated 10/1/2018). per initial evaluation: The patient, Chau Cardona, is a 43 y.o.   WHITE [place]  female with a past psychiatric history significant for opiate dependence, who presents at this time  as a transfer from [facility] where she was admitted for severe EPS and reported paranoia. She was arrested and jailed for a couple of months for drug paraphernalia, then transferred to [facility] forensic unit for psychosis, mood swings, etc. During her hospitalization, she was given Haldol dec monthly x 3. She was released two weeks ago and developed severe EPS- stiffness, rigidity, etc. She went to [facility] and received benadryl and cogentin, improved but noted to have paranoia. Opiate user, not interested in substance abuse treatment. She struggled to explain her symptoms, but she reports having a diagnosis of bipolar disorder. [place] presents/reports/evidences the following emotional symptoms today, 10/1/2018:severe dystonia, constricted affect, isolative. . The above symptoms have been present for . These symptoms are of high severity. The symptoms are intermittent in nature. An episode of yelling at her roommate yesterday, but likely provoked by roommate. She denies any complaints this morning. SIDE EFFECTS: (reviewed/updated 10/1/2018) None reported or admitted to. 
none ALLERGIES:(reviewed/updated 10/1/2018) No Known Allergies MEDICATIONS PRIOR TO ADMISSION:(reviewed/updated 10/1/2018) Prescriptions Prior to Admission Medication Sig  
 benztropine (COGENTIN) 1 mg tablet Take 1 Tab by mouth two (2) times a day.  zolpidem (AMBIEN) 5 mg tablet Take 1 Tab by mouth nightly as needed for Sleep. Max Daily Amount: 5 mg.  diphenhydrAMINE (BENADRYL) 25 mg capsule Take  mg by mouth nightly.  aspirin delayed-release 81 mg tablet Take 81 mg by mouth daily as needed for Pain.  ibuprofen (MOTRIN) 200 mg tablet Take 200 mg by mouth every four (4) hours as needed for Pain.   
  
PAST MEDICAL HISTORY: Past medical history from the initial psychiatric evaluation has been reviewed (reviewed/updated 10/1/2018) with no additional updates (I asked patient and no additional past medical history provided). Past Medical History:  
Diagnosis Date  Attention and concentration deficit  [facility]  Chronic fatigue  Chronic pain  Fibromyalgia 6 years ago Dr. Gisselle Parks  Hearing voices Dr. Maria Eugenia Dunn  Hx of depression headache  Post herpetic neuralgia  Schizophrenia ([facility]  Shingles 2 weeks ago No past surgical history on file. SOCIAL HISTORY: Social history from the initial psychiatric evaluation has been reviewed (reviewed/updated 10/1/2018) with no additional updates (I asked patient and no additional social history provided). Social History Social History  Marital status: SINGLE Spouse name: N/A  
 Number of children: N/A  
 Years of education: N/A Occupational History  Not on file. Social History Main Topics  Smoking status: Current Every Day Smoker Packs/day: 1.00 Years: 10.00  Smokeless tobacco: Never Used  Alcohol use Not on file Comment: not really  Drug use: No  
 Sexual activity: No  
 
Other Topics Concern  Not on file Social History Narrative Has 2 children FAMILY HISTORY: Family history from the initial psychiatric evaluation has been reviewed (reviewed/updated 10/1/2018) with no additional updates (I asked patient and no additional family history provided). Family History Problem Relation Age of Onset  Diabetes Mother  Thyroid Disease Mother  COPD Mother  Stroke Paternal Grandfather  Heart Attack Father At age 39  Bipolar Disorder Father REVIEW OF SYSTEMS: (reviewed/updated 10/1/2018) Appetite:good Sleep: good All other Review of Systems: Negative except paranoia [facility] MENTAL STATUS EXAM (MSE):   
 MSE FINDINGS ARE WITHIN NORMAL LIMITS (WNL) UNLESS OTHERWISE STATED BELOW. ( ALL OF THE BELOW CATEGORIES OF THE MSE HAVE BEEN REVIEWED (reviewed 10/1/2018) AND UPDATED AS DEEMED APPROPRIATE ) General Presentation age appropriate and casually dressed, cooperative Orientation oriented to time, place and person Vital Signs  See below (reviewed 10/1/2018); Vital Signs (BP, Pulse, & Temp) are within normal limits if not listed below. Gait and Station Stable/steady, no ataxia Musculoskeletal System No extrapyramidal symptoms (EPS); no abnormal muscular movements or Tardive Dyskinesia (TD); muscle strength and tone are within normal limits Language No aphasia or dysarthria Speech:  dyasrthria Thought Processes (+)logical; normal rate of thoughts; poor abstract reasoning/computation Thought Associations loose associations and tangential  
Thought Content not internally preoccupied Suicidal Ideations none Homicidal Ideations none Mood:  labile Affect:  labile Memory recent  fair Memory remote:  fair Concentration/Attention:  distractable Fund of Knowledge avg Insight:  fair Reliability fair Judgment:  fair VITALS:    
Patient Vitals for the past 24 hrs: 
 Temp Pulse Resp BP SpO2  
10/01/18 0838 97.4 °F (36.3 °C) 80 18 113/81 100 % Wt Readings from Last 3 Encounters:  
09/26/18 79.4 kg (175 lb)  
07/22/17 75.6 kg (166 lb 10.7 oz) 10/28/14 74 kg (163 lb 3.2 oz) Temp Readings from Last 3 Encounters:  
10/01/18 97.4 °F (36.3 °C)  
09/29/18 98.2 °F (36.8 °C)  
07/22/17 98.1 °F (36.7 °C) BP Readings from Last 3 Encounters:  
10/01/18 113/81  
09/29/18 120/67  
07/22/17 124/76 Pulse Readings from Last 3 Encounters:  
10/01/18 80  
09/29/18 60  
07/22/17 72 DATA LABORATORY DATA:(reviewed/updated 10/1/2018) No results found for this or any previous visit (from the past 24 hour(s)).  
No results found for: VALF2, VALAC, VALP, VALPR, DS6, CRBAM, CRBAMP, Alice Owen 
No results found for: LITH  
RADIOLOGY REPORTS:(reviewed/updated 10/1/2018) No results found. MEDICATIONS ALL MEDICATIONS:  
Current Facility-Administered Medications Medication Dose Route Frequency  benztropine (COGENTIN) tablet 1 mg  1 mg Oral TID  divalproex DR (DEPAKOTE) tablet 500 mg  500 mg Oral BID  ziprasidone (GEODON) 20 mg in sterile water (preservative free) 1 mL injection  20 mg IntraMUSCular BID PRN  
 OLANZapine (ZyPREXA) tablet 5 mg  5 mg Oral Q6H PRN  
 benztropine (COGENTIN) tablet 2 mg  2 mg Oral BID PRN  
 benztropine (COGENTIN) injection 2 mg  2 mg IntraMUSCular BID PRN  
 LORazepam (ATIVAN) injection 2 mg  2 mg IntraMUSCular Q4H PRN  
 LORazepam (ATIVAN) tablet 1 mg  1 mg Oral Q4H PRN  
 zolpidem (AMBIEN) tablet 10 mg  10 mg Oral QHS PRN  
 acetaminophen (TYLENOL) tablet 650 mg  650 mg Oral Q4H PRN  
 ibuprofen (MOTRIN) tablet 400 mg  400 mg Oral Q8H PRN  
 magnesium hydroxide (MILK OF MAGNESIA) 400 mg/5 mL oral suspension 30 mL  30 mL Oral DAILY PRN  
 nicotine (NICODERM CQ) 21 mg/24 hr patch 1 Patch  1 Patch TransDERmal DAILY PRN  
  
SCHEDULED MEDICATIONS:  
Current Facility-Administered Medications Medication Dose Route Frequency  benztropine (COGENTIN) tablet 1 mg  1 mg Oral TID  divalproex DR (DEPAKOTE) tablet 500 mg  500 mg Oral BID ASSESSMENT & PLAN  
 
DIAGNOSES REQUIRING ACTIVE TREATMENT AND MONITORING: (reviewed/updated 10/1/2018) Patient Active Hospital Problem List: 
  
 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 In summary, Dick Dill, is a 43 y.o.  female who presents with a severe exacerbation of the principal diagnosis of Psychosis ([address] Patient's condition is improving. Patient requires continued inpatient hospitalization for further stabilization, safety monitoring and medication management. I will continue to coordinate the provision of individual, milieu, occupational, group, and substance abuse therapies to address target symptoms/diagnoses as deemed appropriate for the individual patient. A coordinated, multidisplinary treatment team round was conducted with the patient (this team consists of the nurse, psychiatric unit pharmcist,  and writer). Complete current electronic health record for patient has been reviewed today including consultant notes, ancillary staff notes, nurses and psychiatric tech notes. Suicide risk assessment completed and patient deemed to be of low risk for suicide at this time. The following regarding medications was addressed during rounds with patient:  
the risks and benefits of the proposed medication. The patient was given the opportunity to ask questions. Informed consent given to the use of the above medications. Will continue to adjust psychiatric and non-psychiatric medications (see above \"medication\" section and orders section for details) as deemed appropriate & based upon diagnoses and response to treatment. I will continue to order blood tests/labs and diagnostic tests as deemed appropriate and review results as they become available (see orders for details and above listed lab/test results). I will order psychiatric records from previous [facility] hospitals to further elucidate the nature of patient's psychopathology and review once available. I will gather additional collateral information from friends, family and o/p treatment team to further elucidate the nature of patient's psychopathology and baselline level of psychiatric functioning. I certify that this patient's inpatient psychiatric hospital services furnished since the previous certification were, and continue to be, required for treatment that could reasonably be expected to improve the patient's condition, or for diagnostic study, and that the patient continues to need, on a daily basis, active treatment furnished directly by or requiring the supervision of inpatient psychiatric facility personnel. In addition, the hospital records show that services furnished were intensive treatment services, admission or related services, or equivalent services. EXPECTED DISCHARGE DATE/DAY: TBD  
 
DISPOSITION: Home Signed By:  
Myrna Rivas MD 
10/1/2018
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
## 2018-10-01 NOTE — PROGRESS NOTES
Problem: Paranoid Ideation Goal: *LTG: Interact with others without defensiveness or anger Outcome: Progressing Towards Goal 
Patient med and meal compliant. Interacting with staff and peers minimally. Less anxious but remains paranoid. No behavioral problems. Bryan continue to monitor patient and behavior every 15 minutes for safety.

## 2018-10-02 PROCEDURE — 65220000003 HC RM SEMIPRIVATE PSYCH

## 2018-10-02 PROCEDURE — 74011250637 HC RX REV CODE- 250/637: Performed by: PSYCHIATRY & NEUROLOGY

## 2018-10-02 RX ORDER — BENZTROPINE MESYLATE 1 MG/1
1 TABLET ORAL ONCE
Status: ACTIVE | OUTPATIENT
Start: 2018-10-02 | End: 2018-10-03

## 2018-10-02 RX ORDER — BENZTROPINE MESYLATE 2 MG/1
2 TABLET ORAL 3 TIMES DAILY
Status: DISCONTINUED | OUTPATIENT
Start: 2018-10-03 | End: 2018-10-05 | Stop reason: HOSPADM

## 2018-10-02 RX ADMIN — DIVALPROEX SODIUM 500 MG: 500 TABLET, DELAYED RELEASE ORAL at 09:04

## 2018-10-02 RX ADMIN — BENZTROPINE MESYLATE 1 MG: 1 TABLET ORAL at 09:04

## 2018-10-02 RX ADMIN — ZOLPIDEM TARTRATE 10 MG: 10 TABLET ORAL at 22:04

## 2018-10-02 RX ADMIN — LORAZEPAM 1 MG: 1 TABLET ORAL at 19:28

## 2018-10-02 RX ADMIN — BENZTROPINE MESYLATE 1 MG: 1 TABLET ORAL at 16:18

## 2018-10-02 RX ADMIN — BENZTROPINE MESYLATE 1 MG: 1 TABLET ORAL at 12:01

## 2018-10-02 RX ADMIN — DIVALPROEX SODIUM 500 MG: 500 TABLET, DELAYED RELEASE ORAL at 21:50

## 2018-10-02 NOTE — BH NOTES
GROUP THERAPY PROGRESS NOTE The patient Savannah marie 43 y.o. female is participating in Coping Skills Group. Group time: 45 minutes Personal goal for participation: To participate in self esteem booster Goal orientation:  personal 
 
Group therapy participation: active Therapeutic interventions reviewed and discussed: ways to nurture yourself Impression of participation:  The patient was attentive. Kaitlyn Moffett 10/2/2018  5:01 PM

## 2018-10-02 NOTE — BH NOTES
Patient is meals & medication compliant. Affect is flat/dull; mood is euthymic. Will continue to monitor patient's status & assess needs.

## 2018-10-02 NOTE — BH NOTES
COSMO  IOP GROUP THERAPY NOTE 
 
IOP Treatment Group: Process Group Description: Education group led by unit staff. Patients are educated on skills designed to support the patient's management of their diagnosis Additional Description of Group: [id number] Session Goal: Patient will gain understanding and treatment of their diagnosis Group Facilitator: Elysia Floyd Co-Facilitator: DUANE Date: 10/1/18 Time: 4P 
Duration (Minutes): 20MIN Method: Free discussion Patient Attendance: 100% Patient Level of Participation: Attended - Did Not Participate Patient Behavior/Symptoms: Distracted Patient Progress: Appears to be decompensating Patient Progress Note: SEE PROGRESS NOTE

## 2018-10-02 NOTE — BH NOTES
Patient continue to isolate self but visible in the milieu. Patient was pacing on the hallway at evening time, compliant with medications requested for prn for sleep, zolpidem 10 mg administered for sleep. Patient slept for 7 hours, breathing was even and unlabored. Q 15 minutes check maintained for safety.

## 2018-10-03 VITALS
HEART RATE: 81 BPM | SYSTOLIC BLOOD PRESSURE: 104 MMHG | DIASTOLIC BLOOD PRESSURE: 73 MMHG | TEMPERATURE: 98.3 F | RESPIRATION RATE: 16 BRPM | OXYGEN SATURATION: 97 %

## 2018-10-03 PROCEDURE — 65220000003 HC RM SEMIPRIVATE PSYCH

## 2018-10-03 PROCEDURE — 74011250637 HC RX REV CODE- 250/637: Performed by: PSYCHIATRY & NEUROLOGY

## 2018-10-03 RX ADMIN — DIVALPROEX SODIUM 500 MG: 500 TABLET, DELAYED RELEASE ORAL at 08:19

## 2018-10-03 RX ADMIN — BENZTROPINE MESYLATE 2 MG: 2 TABLET ORAL at 08:19

## 2018-10-03 RX ADMIN — DIVALPROEX SODIUM 500 MG: 500 TABLET, DELAYED RELEASE ORAL at 21:25

## 2018-10-03 RX ADMIN — ZOLPIDEM TARTRATE 10 MG: 10 TABLET ORAL at 21:25

## 2018-10-03 RX ADMIN — LORAZEPAM 1 MG: 1 TABLET ORAL at 19:19

## 2018-10-03 RX ADMIN — BENZTROPINE MESYLATE 2 MG: 2 TABLET ORAL at 11:34

## 2018-10-03 RX ADMIN — BENZTROPINE MESYLATE 2 MG: 2 TABLET ORAL at 17:37

## 2018-10-03 NOTE — PROGRESS NOTES
Problem: Paranoid Ideation Goal: *LTG: Interact with others without defensiveness or anger Outcome: Progressing Towards Goal 
Patient alert and oriented. Compliant with meals and medications. Denies suicidal and homicidal ideations. Denies any hallucinations. No behavioral issues at this time. Will continue to assess patient and complete safety checks.

## 2018-10-03 NOTE — BH NOTES
PSYCHIATRIC PROGRESS NOTE Patient Name  Ashish Stoddard Date of Birth 1980 [facility] [id number] Medical Record Number  [id number] Age  43 y.o. PCP Courtney Prescott MD  
Admit date:  9/29/2018 Room Number  173/94  @ [facility]  
Date of Service  10/2/2018 PSYCHOTHERAPY SESSION NOTE: 
Length of psychotherapy session: 20 minutes Main condition/diagnosis/issues treated during session today, 10/2/2018 : EPS, mood lability I employed Cognitive Behavioral therapy techniques, Reality-Oriented psychotherapy, as well as supportive psychotherapy in regards to various ongoing psychosocial stressors, including the following: pre-admission and current problems; housing issues;  medical issues; and stress of hospitalization. Interpersonal relationship issues and psychodynamic conflicts explored. Attempts made to alleviate maladaptive patterns. We, also, worked on issues of denial & effects of substance dependency/use Overall, patient is not progressing Treatment Plan Update (reviewed an updated 10/2/2018) : I will modify psychotherapy tx plan by implementing more stress management strategies, building upon cognitive behavioral techniques, increasing coping skills, as well as shoring up psychological defenses). An extended energy and skill set was needed to engage pt in psychotherapy due to some of the following: resistiveness, complexity, negativity, confrontational nature, hostile behaviors, and/or severe abnormalities in thought processes/psychosis resulting in the loss of expressive/receptive language communication skills. E & M PROGRESS NOTE: 
  
 
 
HISTORY  
   
CC:  \"A little better\" HISTORY OF PRESENT ILLNESS/INTERVAL HISTORY:  (reviewed/updated 10/2/2018). per initial evaluation: The patient, Ashish Stoddard, is a 43 y.o.   WHITE [place]  female with a past psychiatric history significant for opiate dependence, who presents at this time  as a transfer from [facility] where she was admitted for severe EPS and reported paranoia. She was arrested and jailed for a couple of months for drug paraphernalia, then transferred to [facility] forensic unit for psychosis, mood swings, etc. During her hospitalization, she was given Haldol dec monthly x 3. She was released two weeks ago and developed severe EPS- stiffness, rigidity, etc. She went to [facility] and received benadryl and cogentin, improved but noted to have paranoia. Opiate user, not interested in substance abuse treatment. She struggled to explain her symptoms, but she reports having a diagnosis of bipolar disorder. Mali Suresh presents/reports/evidences the following emotional symptoms today, 10/2/2018:severe dystonia, constricted affect, isolative. . The above symptoms have been present for . These symptoms are of high severity. The symptoms are intermittent in nature. She continues to report improvements, less muscle aches and muscle stiffness. No ah/ vh/ ;paranoia. Not interested in satp. SIDE EFFECTS: (reviewed/updated 10/2/2018) None reported or admitted to. 
none ALLERGIES:(reviewed/updated 10/2/2018) No Known Allergies MEDICATIONS PRIOR TO ADMISSION:(reviewed/updated 10/2/2018) Prescriptions Prior to Admission Medication Sig  
 benztropine (COGENTIN) 1 mg tablet Take 1 Tab by mouth two (2) times a day.  zolpidem (AMBIEN) 5 mg tablet Take 1 Tab by mouth nightly as needed for Sleep. Max Daily Amount: 5 mg.  diphenhydrAMINE (BENADRYL) 25 mg capsule Take  mg by mouth nightly.  aspirin delayed-release 81 mg tablet Take 81 mg by mouth daily as needed for Pain.  ibuprofen (MOTRIN) 200 mg tablet Take 200 mg by mouth every four (4) hours as needed for Pain.   
  
PAST MEDICAL HISTORY: Past medical history from the initial psychiatric evaluation has been reviewed (reviewed/updated 10/2/2018) with no additional updates (I asked patient and no additional past medical history provided). Past Medical History:  
Diagnosis Date  Attention and concentration deficit  Bipolar disorder ([facility] [address]  Chronic fatigue  Chronic pain  Fibromyalgia 6 years ago Dr. Rambo Jane  Hearing voices Dr. Jey Anderson  Hx of depression headache  Post herpetic neuralgia  Schizophrenia ([facility] [address]  Shingles 2 weeks ago No past surgical history on file. SOCIAL HISTORY: Social history from the initial psychiatric evaluation has been reviewed (reviewed/updated 10/2/2018) with no additional updates (I asked patient and no additional social history provided). Social History Social History  Marital status: SINGLE Spouse name: N/A  
 Number of children: N/A  
 Years of education: N/A Occupational History  Not on file. Social History Main Topics  Smoking status: Current Every Day Smoker Packs/day: 1.00 Years: 10.00  Smokeless tobacco: Never Used  Alcohol use Not on file Comment: not really  Drug use: No  
 Sexual activity: No  
 
Other Topics Concern  Not on file Social History Narrative Has 2 children FAMILY HISTORY: Family history from the initial psychiatric evaluation has been reviewed (reviewed/updated 10/2/2018) with no additional updates (I asked patient and no additional family history provided). Family History Problem Relation Age of Onset  Diabetes Mother  Thyroid Disease Mother  COPD Mother  Stroke Paternal Grandfather  Heart Attack Father At age 39  Bipolar Disorder Father REVIEW OF SYSTEMS: (reviewed/updated 10/2/2018) Appetite:good Sleep: good All other Review of Systems: Negative except paranoia Ashleysteric MENTAL STATUS EXAM (MSE):   
 MSE FINDINGS ARE WITHIN NORMAL LIMITS (WNL) UNLESS OTHERWISE STATED BELOW. ( ALL OF THE BELOW CATEGORIES OF THE MSE HAVE BEEN REVIEWED (reviewed 10/2/2018) AND UPDATED AS DEEMED APPROPRIATE ) General Presentation age appropriate and casually dressed, cooperative Orientation oriented to time, place and person Vital Signs  See below (reviewed 10/2/2018); Vital Signs (BP, Pulse, & Temp) are within normal limits if not listed below. Gait and Station Stable/steady, no ataxia Musculoskeletal System (+) extrapyramidal symptoms (EPS); constricted affect;limited spontaneous movements Language No aphasia or dysarthria Speech:  dyasrthria Thought Processes (+)logical; normal rate of thoughts; poor abstract reasoning/computation Thought Associations loose associations and tangential  
Thought Content not internally preoccupied Suicidal Ideations none Homicidal Ideations none Mood:  labile Affect:  labile Memory recent  fair Memory remote:  fair Concentration/Attention:  distractable Fund of Knowledge avg Insight:  fair Reliability fair Judgment:  fair VITALS:    
No data found. Wt Readings from Last 3 Encounters:  
09/26/18 79.4 kg (175 lb)  
07/22/17 75.6 kg (166 lb 10.7 oz) 10/28/14 74 kg (163 lb 3.2 oz) Temp Readings from Last 3 Encounters:  
10/01/18 98.9 °F (37.2 °C)  
09/29/18 98.2 °F (36.8 °C)  
07/22/17 98.1 °F (36.7 °C) BP Readings from Last 3 Encounters:  
10/01/18 116/79  
09/29/18 120/67  
07/22/17 124/76 Pulse Readings from Last 3 Encounters:  
10/01/18 80  
09/29/18 60  
07/22/17 72 DATA LABORATORY DATA:(reviewed/updated 10/2/2018) No results found for this or any previous visit (from the past 24 hour(s)). No results found for: VALF2, VALAC, VALP, VALPR, DS6, CRBAM, CRBAMP, CARB2, XCRBAM 
No results found for: LITHM  
RADIOLOGY REPORTS:(reviewed/updated 10/2/2018) No results found. MEDICATIONS ALL MEDICATIONS:  
 Current Facility-Administered Medications Medication Dose Route Frequency  [START ON 10/3/2018] benztropine (COGENTIN) tablet 2 mg  2 mg Oral TID  benztropine (COGENTIN) tablet 1 mg  1 mg Oral ONCE  
 divalproex DR (DEPAKOTE) tablet 500 mg  500 mg Oral BID  ziprasidone (GEODON) 20 mg in sterile water (preservative free) 1 mL injection  20 mg IntraMUSCular BID PRN  
 OLANZapine (ZyPREXA) tablet 5 mg  5 mg Oral Q6H PRN  
 benztropine (COGENTIN) tablet 2 mg  2 mg Oral BID PRN  
 benztropine (COGENTIN) injection 2 mg  2 mg IntraMUSCular BID PRN  
 LORazepam (ATIVAN) injection 2 mg  2 mg IntraMUSCular Q4H PRN  
 LORazepam (ATIVAN) tablet 1 mg  1 mg Oral Q4H PRN  
 zolpidem (AMBIEN) tablet 10 mg  10 mg Oral QHS PRN  
 acetaminophen (TYLENOL) tablet 650 mg  650 mg Oral Q4H PRN  
 ibuprofen (MOTRIN) tablet 400 mg  400 mg Oral Q8H PRN  
 magnesium hydroxide (MILK OF MAGNESIA) 400 mg/5 mL oral suspension 30 mL  30 mL Oral DAILY PRN  
 nicotine (NICODERM CQ) 21 mg/24 hr patch 1 Patch  1 Patch TransDERmal DAILY PRN  
  
SCHEDULED MEDICATIONS:  
Current Facility-Administered Medications Medication Dose Route Frequency  [START ON 10/3/2018] benztropine (COGENTIN) tablet 2 mg  2 mg Oral TID  benztropine (COGENTIN) tablet 1 mg  1 mg Oral ONCE  
 divalproex DR (DEPAKOTE) tablet 500 mg  500 mg Oral BID ASSESSMENT & PLAN  
 
DIAGNOSES REQUIRING ACTIVE TREATMENT AND MONITORING: (reviewed/updated 10/2/2018) Patient Active Hospital Problem List: 
  
 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 In summary, Mali Suresh, is a 43 y.o.  female who presents with a severe exacerbation of the principal diagnosis of Psychosis ([address] Patient's condition is improving. Patient requires continued inpatient hospitalization for further stabilization, safety monitoring and medication management. I will continue to coordinate the provision of individual, milieu, occupational, group, and substance abuse therapies to address target symptoms/diagnoses as deemed appropriate for the individual patient. A coordinated, multidisplinary treatment team round was conducted with the patient (this team consists of the nurse, psychiatric unit pharmcist,  and writer). Complete current electronic health record for patient has been reviewed today including consultant notes, ancillary staff notes, nurses and psychiatric tech notes. Suicide risk assessment completed and patient deemed to be of low risk for suicide at this time. The following regarding medications was addressed during rounds with patient:  
the risks and benefits of the proposed medication. The patient was given the opportunity to ask questions. Informed consent given to the use of the above medications. Will continue to adjust psychiatric and non-psychiatric medications (see above \"medication\" section and orders section for details) as deemed appropriate & based upon diagnoses and response to treatment. I will continue to order blood tests/labs and diagnostic tests as deemed appropriate and review results as they become available (see orders for details and above listed lab/test results). I will order psychiatric records from previous [facility] to further elucidate the nature of patient's psychopathology and review once available. I will gather additional collateral information from friends, family and o/p treatment team to further elucidate the nature of patient's psychopathology and baselline level of psychiatric functioning. I certify that this patient's inpatient psychiatric hospital services furnished since the previous certification were, and continue to be, required for treatment that could reasonably be expected to improve the patient's condition, or for diagnostic study, and that the patient continues to need, on a daily basis, active treatment furnished directly by or requiring the supervision of inpatient psychiatric facility personnel. In addition, the hospital records show that services furnished were intensive treatment services, admission or related services, or equivalent services. EXPECTED DISCHARGE DATE/DAY: TBD  
 
DISPOSITION: Home Signed By:  
Candis Templeton MD 
10/2/2018

## 2018-10-04 LAB — VALPROATE SERPL-MCNC: 123 UG/ML (ref 50–100)

## 2018-10-04 PROCEDURE — 36415 COLL VENOUS BLD VENIPUNCTURE: CPT | Performed by: PSYCHIATRY & NEUROLOGY

## 2018-10-04 PROCEDURE — 74011250637 HC RX REV CODE- 250/637: Performed by: PSYCHIATRY & NEUROLOGY

## 2018-10-04 PROCEDURE — 65220000003 HC RM SEMIPRIVATE PSYCH

## 2018-10-04 PROCEDURE — 80164 ASSAY DIPROPYLACETIC ACD TOT: CPT | Performed by: PSYCHIATRY & NEUROLOGY

## 2018-10-04 RX ADMIN — BENZTROPINE MESYLATE 2 MG: 2 TABLET ORAL at 09:30

## 2018-10-04 RX ADMIN — DIVALPROEX SODIUM 500 MG: 500 TABLET, DELAYED RELEASE ORAL at 21:17

## 2018-10-04 RX ADMIN — BENZTROPINE MESYLATE 2 MG: 2 TABLET ORAL at 17:00

## 2018-10-04 RX ADMIN — BENZTROPINE MESYLATE 2 MG: 2 TABLET ORAL at 12:53

## 2018-10-04 RX ADMIN — ZOLPIDEM TARTRATE 10 MG: 10 TABLET ORAL at 21:17

## 2018-10-04 RX ADMIN — DIVALPROEX SODIUM 500 MG: 500 TABLET, DELAYED RELEASE ORAL at 09:30

## 2018-10-04 NOTE — PROGRESS NOTES
Problem: Paranoid Ideation Goal: *LTG: Interact with others without defensiveness or anger Outcome: Progressing Towards Goal 
Patient alert and oriented. Compliant with medications. Denies suicidal and homicidal ideations. Denies any hallucinations. No behavioral issues at this time. Will continue to assess patient and monitor Q 15 minutes for safety.

## 2018-10-04 NOTE — BH NOTES
Pt has isolated to her room for most of this shift, appears asleep. Pt did come out for breakfast and lunch and met with the treatment team. Pt's affect is very flat and her speech is slow at times. Pt states that she didn't sleep last night and is tired today. Pt does not appear to be in distress, will continue to monitor per protocol.

## 2018-10-04 NOTE — PROGRESS NOTES
Laboratory Monitoring for Valproic Acid This patient is currently prescribed the following medication(s):  
Current Facility-Administered Medications Medication Dose Route Frequency  benztropine (COGENTIN) tablet 2 mg  2 mg Oral TID  divalproex DR (DEPAKOTE) tablet 500 mg  500 mg Oral BID The following labs have been completed for monitoring of valproic acid: 
 
Valproic Acid Serum Concentration Lab Results Component Value Date/Time Valproic acid 123 (H) 10/04/2018 04:25 AM  
   
 
Hepatic Function Lab Results Component Value Date/Time Bilirubin, total 0.8 09/27/2018 03:49 AM  
 Protein, total 6.9 09/27/2018 03:49 AM  
 Albumin 3.8 09/27/2018 03:49 AM  
 Globulin 3.1 09/27/2018 03:49 AM  
 A-G Ratio 1.2 09/27/2018 03:49 AM  
 ALT (SGPT) 45 09/27/2018 03:49 AM  
 Alk. phosphatase 47 09/27/2018 03:49 AM  
 
 
Hematology Lab Results Component Value Date/Time WBC 8.4 09/27/2018 03:49 AM  
 RBC 4.06 09/27/2018 03:49 AM  
 HGB 12.9 09/27/2018 03:49 AM  
 HCT 38.1 09/27/2018 03:49 AM  
 MCV 93.8 09/27/2018 03:49 AM  
 MCH 31.8 09/27/2018 03:49 AM  
 MCHC 33.9 09/27/2018 03:49 AM  
 RDW 12.1 09/27/2018 03:49 AM  
 PLATELET 661 23/89/4630 03:49 AM  
 
 
Assessment/Plan: 
Valproic acid level is within therapeutic limits, 123 mcg/mL, at higher end of range ( mcg/mL). Level was drawn approximately 7 hours post-dose and after 4 complete days of therapy which is reflective of steady-state concentration. Same dose continued. Sharri Martell, PharmD, BCPS 
[phone]

## 2018-10-04 NOTE — BH NOTES
PSYCHIATRIC PROGRESS NOTE Patient Name  Andry Alonso Date of Birth 1980 [facility] [id number] Medical Record Number  [id number] Age  43 y.o. PCP Med Araya MD  
Admit date:  9/29/2018 Room Number  949/39  @ [facility]  
Date of Service  10/3/2018 PSYCHOTHERAPY SESSION NOTE: 
Length of psychotherapy session: 20 minutes Main condition/diagnosis/issues treated during session today, 10/3/2018 : EPS, mood lability I employed Cognitive Behavioral therapy techniques, Reality-Oriented psychotherapy, as well as supportive psychotherapy in regards to various ongoing psychosocial stressors, including the following: pre-admission and current problems; housing issues;  medical issues; and stress of hospitalization. Interpersonal relationship issues and psychodynamic conflicts explored. Attempts made to alleviate maladaptive patterns. We, also, worked on issues of denial & effects of substance dependency/use Overall, patient is not progressing Treatment Plan Update (reviewed an updated 10/3/2018) : I will modify psychotherapy tx plan by implementing more stress management strategies, building upon cognitive behavioral techniques, increasing coping skills, as well as shoring up psychological defenses). An extended energy and skill set was needed to engage pt in psychotherapy due to some of the following: resistiveness, complexity, negativity, confrontational nature, hostile behaviors, and/or severe abnormalities in thought processes/psychosis resulting in the loss of expressive/receptive language communication skills. E & M PROGRESS NOTE: 
  
 
 
HISTORY  
   
CC:  \"A little better\" HISTORY OF PRESENT ILLNESS/INTERVAL HISTORY:  (reviewed/updated 10/3/2018). per initial evaluation: The patient, Andry Alonso, is a 43 y.o.   WHITE [place]  female with a past psychiatric history significant for opiate dependence, who presents at this time  as a transfer from [facility] where she was admitted for severe EPS and reported paranoia. She was arrested and jailed for a couple of months for drug paraphernalia, then transferred to [facility] forensic unit for psychosis, mood swings, etc. During her hospitalization, she was given Haldol dec monthly x 3. She was released two weeks ago and developed severe EPS- stiffness, rigidity, etc. She went to [facility] and received benadryl and cogentin, improved but noted to have paranoia. Opiate user, not interested in substance abuse treatment. She struggled to explain her symptoms, but she reports having a diagnosis of bipolar disorder. Cira Billings presents/reports/evidences the following emotional symptoms today, 10/3/2018:severe dystonia, constricted affect, isolative. . The above symptoms have been present for . These symptoms are of high severity. The symptoms are intermittent in nature. Continued improvement SIDE EFFECTS: (reviewed/updated 10/3/2018) None reported or admitted to. 
none ALLERGIES:(reviewed/updated 10/3/2018) No Known Allergies MEDICATIONS PRIOR TO ADMISSION:(reviewed/updated 10/3/2018) Prescriptions Prior to Admission Medication Sig  
 benztropine (COGENTIN) 1 mg tablet Take 1 Tab by mouth two (2) times a day.  zolpidem (AMBIEN) 5 mg tablet Take 1 Tab by mouth nightly as needed for Sleep. Max Daily Amount: 5 mg.  diphenhydrAMINE (BENADRYL) 25 mg capsule Take  mg by mouth nightly.  aspirin delayed-release 81 mg tablet Take 81 mg by mouth daily as needed for Pain.  ibuprofen (MOTRIN) 200 mg tablet Take 200 mg by mouth every four (4) hours as needed for Pain.   
  
PAST MEDICAL HISTORY: Past medical history from the initial psychiatric evaluation has been reviewed (reviewed/updated 10/3/2018) with no additional updates (I asked patient and no additional past medical history provided). Past Medical History:  
Diagnosis Date  Attention and concentration deficit  Bipolar disorder ([facility] [address]  Chronic fatigue  Chronic pain  Fibromyalgia 6 years ago Dr. Benitez Alas  Hearing voices Dr. Alise Arzola  Hx of depression headache  Post herpetic neuralgia  Schizophrenia ([facility] [address]  Shingles 2 weeks ago No past surgical history on file. SOCIAL HISTORY: Social history from the initial psychiatric evaluation has been reviewed (reviewed/updated 10/3/2018) with no additional updates (I asked patient and no additional social history provided). Social History Social History  Marital status: SINGLE Spouse name: N/A  
 Number of children: N/A  
 Years of education: N/A Occupational History  Not on file. Social History Main Topics  Smoking status: Current Every Day Smoker Packs/day: 1.00 Years: 10.00  Smokeless tobacco: Never Used  Alcohol use Not on file Comment: not really  Drug use: No  
 Sexual activity: No  
 
Other Topics Concern  Not on file Social History Narrative Has 2 children FAMILY HISTORY: Family history from the initial psychiatric evaluation has been reviewed (reviewed/updated 10/3/2018) with no additional updates (I asked patient and no additional family history provided). Family History Problem Relation Age of Onset  Diabetes Mother  Thyroid Disease Mother  COPD Mother  Stroke Paternal Grandfather  Heart Attack Father At age 39  Bipolar Disorder Father REVIEW OF SYSTEMS: (reviewed/updated 10/3/2018) Appetite:good Sleep: good All other Review of Systems: Negative except paranoia [place] MENTAL STATUS EXAM (MSE): MSE FINDINGS ARE WITHIN NORMAL LIMITS (WNL) UNLESS OTHERWISE STATED BELOW. ( ALL OF THE BELOW CATEGORIES OF THE MSE HAVE BEEN REVIEWED (reviewed 10/3/2018) AND UPDATED AS DEEMED APPROPRIATE ) General Presentation age appropriate and casually dressed, cooperative Orientation oriented to time, place and person Vital Signs  See below (reviewed 10/3/2018); Vital Signs (BP, Pulse, & Temp) are within normal limits if not listed below. Gait and Station Stable/steady, no ataxia Musculoskeletal System (+) extrapyramidal symptoms (EPS); constricted affect;limited spontaneous movements Language No aphasia or dysarthria Speech:  dyasrthria Thought Processes (+)logical; normal rate of thoughts; poor abstract reasoning/computation Thought Associations loose associations and tangential  
Thought Content not internally preoccupied Suicidal Ideations none Homicidal Ideations none Mood:  labile Affect:  labile Memory recent  fair Memory remote:  fair Concentration/Attention:  distractable Fund of Knowledge avg Insight:  fair Reliability fair Judgment:  fair VITALS:    
Patient Vitals for the past 24 hrs: 
 Temp Pulse Resp BP SpO2  
10/03/18 0817 98.4 °F (36.9 °C) 79 18 100/72 99 % Wt Readings from Last 3 Encounters:  
09/26/18 79.4 kg (175 lb)  
07/22/17 75.6 kg (166 lb 10.7 oz) 10/28/14 74 kg (163 lb 3.2 oz) Temp Readings from Last 3 Encounters:  
10/03/18 98.4 °F (36.9 °C)  
09/29/18 98.2 °F (36.8 °C)  
07/22/17 98.1 °F (36.7 °C) BP Readings from Last 3 Encounters:  
10/03/18 100/72  
09/29/18 120/67  
07/22/17 124/76 Pulse Readings from Last 3 Encounters:  
10/03/18 79  
09/29/18 60  
07/22/17 72 DATA LABORATORY DATA:(reviewed/updated 10/3/2018) No results found for this or any previous visit (from the past 24 hour(s)). No results found for: VALF2, VALAC, VALP, VALPR, DS6, CRBAM, CRBAMP, CARB2, XCRBAM 
No results found for: LITHM  
RADIOLOGY REPORTS:(reviewed/updated 10/3/2018) No results found. MEDICATIONS ALL MEDICATIONS:  
Current Facility-Administered Medications Medication Dose Route Frequency  benztropine (COGENTIN) tablet 2 mg  2 mg Oral TID  divalproex DR (DEPAKOTE) tablet 500 mg  500 mg Oral BID  ziprasidone (GEODON) 20 mg in sterile water (preservative free) 1 mL injection  20 mg IntraMUSCular BID PRN  
 OLANZapine (ZyPREXA) tablet 5 mg  5 mg Oral Q6H PRN  
 benztropine (COGENTIN) tablet 2 mg  2 mg Oral BID PRN  
 benztropine (COGENTIN) injection 2 mg  2 mg IntraMUSCular BID PRN  
 LORazepam (ATIVAN) injection 2 mg  2 mg IntraMUSCular Q4H PRN  
 LORazepam (ATIVAN) tablet 1 mg  1 mg Oral Q4H PRN  
 zolpidem (AMBIEN) tablet 10 mg  10 mg Oral QHS PRN  
 acetaminophen (TYLENOL) tablet 650 mg  650 mg Oral Q4H PRN  
 ibuprofen (MOTRIN) tablet 400 mg  400 mg Oral Q8H PRN  
 magnesium hydroxide (MILK OF MAGNESIA) 400 mg/5 mL oral suspension 30 mL  30 mL Oral DAILY PRN  
 nicotine (NICODERM CQ) 21 mg/24 hr patch 1 Patch  1 Patch TransDERmal DAILY PRN  
  
SCHEDULED MEDICATIONS:  
Current Facility-Administered Medications Medication Dose Route Frequency  benztropine (COGENTIN) tablet 2 mg  2 mg Oral TID  divalproex DR (DEPAKOTE) tablet 500 mg  500 mg Oral BID ASSESSMENT & PLAN  
 
DIAGNOSES REQUIRING ACTIVE TREATMENT AND MONITORING: (reviewed/updated 10/3/2018) Patient Active Hospital Problem List: 
  
 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 In summary, Surendra Pino, is a 43 y.o.  female who presents with a severe exacerbation of the principal diagnosis of Psychosis ([address] Patient's condition is improving. Patient requires continued inpatient hospitalization for further stabilization, safety monitoring and medication management. I will continue to coordinate the provision of individual, milieu, occupational, group, and substance abuse therapies to address target symptoms/diagnoses as deemed appropriate for the individual patient. A coordinated, multidisplinary treatment team round was conducted with the patient (this team consists of the nurse, psychiatric unit pharmcist,  and writer). Complete current electronic health record for patient has been reviewed today including consultant notes, ancillary staff notes, nurses and psychiatric tech notes. Suicide risk assessment completed and patient deemed to be of low risk for suicide at this time. The following regarding medications was addressed during rounds with patient:  
the risks and benefits of the proposed medication. The patient was given the opportunity to ask questions. Informed consent given to the use of the above medications. Will continue to adjust psychiatric and non-psychiatric medications (see above \"medication\" section and orders section for details) as deemed appropriate & based upon diagnoses and response to treatment. I will continue to order blood tests/labs and diagnostic tests as deemed appropriate and review results as they become available (see orders for details and above listed lab/test results). I will order psychiatric records from previous [facility] hospitals to further elucidate the nature of patient's psychopathology and review once available.  
 
I will gather additional collateral information from friends, family and o/p treatment team to further elucidate the nature of patient's psychopathology and baselline level of psychiatric functioning. I certify that this patient's inpatient psychiatric hospital services furnished since the previous certification were, and continue to be, required for treatment that could reasonably be expected to improve the patient's condition, or for diagnostic study, and that the patient continues to need, on a daily basis, active treatment furnished directly by or requiring the supervision of inpatient psychiatric facility personnel. In addition, the hospital records show that services furnished were intensive treatment services, admission or related services, or equivalent services. EXPECTED DISCHARGE DATE/DAY: TBD  
 
DISPOSITION: Home Signed By:  
John Levi MD 
10/3/2018

## 2018-10-04 NOTE — BH NOTES
GROUP THERAPY PROGRESS NOTE Michelle Sanders is participating in [employer]. Group time: 30 minutes Personal goal for participation:  Unit orientation Goal orientation: [place] Group therapy participation: active Therapeutic interventions reviewed and discussed: Yes Impression of participation: good

## 2018-10-05 PROCEDURE — 74011250637 HC RX REV CODE- 250/637: Performed by: PSYCHIATRY & NEUROLOGY

## 2018-10-05 RX ORDER — BENZTROPINE MESYLATE 2 MG/1
2 TABLET ORAL 3 TIMES DAILY
Qty: 45 TAB | Refills: 1 | Status: SHIPPED | OUTPATIENT
Start: 2018-10-05

## 2018-10-05 RX ORDER — DIVALPROEX SODIUM 500 MG/1
500 TABLET, DELAYED RELEASE ORAL 2 TIMES DAILY
Qty: 30 TAB | Refills: 1 | Status: SHIPPED | OUTPATIENT
Start: 2018-10-05

## 2018-10-05 RX ADMIN — DIVALPROEX SODIUM 500 MG: 500 TABLET, DELAYED RELEASE ORAL at 09:05

## 2018-10-05 RX ADMIN — BENZTROPINE MESYLATE 2 MG: 2 TABLET ORAL at 12:03

## 2018-10-05 RX ADMIN — BENZTROPINE MESYLATE 2 MG: 2 TABLET ORAL at 09:05

## 2018-10-05 NOTE — BH NOTES
Patient Slept 7 hours this shift. Patient does not appear to be in distress at this time. Staff continues to monitor Q 15 minutes for safety.

## 2018-10-05 NOTE — DISCHARGE INSTRUCTIONS
Psychosis: Care Instructions  Your Care Instructions  A person with psychosis cannot tell the difference between what is real and what is not real. It can cause strange thoughts and behaviors. A person with psychosis may have:  · Delusions. These are beliefs that are not real.  · Hallucinations. These are things that the person sees or hears that are not really there. · Personality changes. Psychosis can be treated with medicines and counseling. It is important to take your medicines exactly as prescribed, even when you feel well. You will need ongoing follow-up care and may need lifelong treatment. When psychosis is not treated, the risks are higher for suicide, a hospital stay, and other problems. Early treatment called coordinated specialty care [facility]) may help a person who is having his or her first episode of psychotic thoughts. Ask your doctor about [address] Follow-up care is a key part of your treatment and safety. Be sure to make and go to all appointments, and call your doctor if you are having problems. It's also a good idea to know your test results and keep a list of the medicines you take. How can you care for yourself at home? · Be safe with medicines. Take your medicines exactly as prescribed. Call your doctor if you think you are having a problem with your medicine. You will get more details on the specific medicines your doctor prescribes. · Go to your counseling sessions and follow-up appointments. · Join a self-help or support group. These groups can be very helpful for some people with psychosis. · Get at least 30 minutes of exercise on most days of the week. Walking is a good choice. You also may want to do other activities, such as running, swimming, cycling, or playing tennis or team sports. · Get enough sleep. · Eat a healthy, balanced diet. It includes whole grains, dairy, fruits and vegetables, and protein. Eat a variety of foods from each of those groups.  This will get you all the nutrients you need. · Avoid alcohol and drugs. · Keep the numbers for these national suicide hotlines: 2-685-883-TALK (0-548.405.9170) and 8-983-ZAPRQRR (9-760.535.7847). If you or someone you know talks about suicide or about feeling hopeless, get help right away. For the caregiver  If you are caring for someone with psychosis, it is important that you take care of yourself as well. · Learn about psychosis. Know the first signs that symptoms are getting worse. · Make a plan with all family members about how to take care of your loved one when his or her symptoms are bad. · Talk about your fears and concerns and those of other family members. · Seek counseling if you need to. · Know your legal rights and the legal rights of your family member or loved one. · Take care of yourself. Stay involved with your own interests, such as your career, hobbies, and friends. Try things like exercise, positive self-talk, relaxation, and deep breathing to help manage your stress. · Give yourself time to grieve. You may need to deal with emotions such as anger, fear, and frustration. After you work through your feelings, you will be better able to care for yourself and your family. When should you call for help? Call 911 anytime you think you may need emergency care. For example, call if:    · You feel you cannot stop from hurting yourself or someone else.     · Someone who has psychosis displays dangerous behavior, and you think the person might hurt himself or herself or someone else.   [facility] your doctor now or seek immediate medical care if:    · A person with psychosis mentions suicide. If a suicide threat seems real, with a specific plan and a way to carry it out, you should stay with the person, or ask someone you trust to stay with the person, until you get help.     · A person who has psychosis:  ¨ Starts to give away his or her possessions. ¨ Uses illegal drugs or drinks alcohol heavily.   ¨ Talks or writes about death, including writing suicide notes and talking about guns, knives, or pills. ¨ Starts to spend a lot of time alone. ¨ Acts very aggressively or suddenly appears calm.     · You hear voices or think you see things that are not there.     · You have a sudden change in behavior.     · You have difficulty taking care of yourself or become confused doing simple chores or tasks.    Watch closely for changes in your health, and be sure to contact your doctor if:    · Your symptoms repeatedly upset your daily activities.     · You have symptoms of psychosis that are new or different from those you had before. Where can you learn more? Go to http://gualberto-evaristo.info/. Enter D547 in the search box to learn more about \"Psychosis: Care Instructions. \"  Current as of: December 7, 2017  Content Version: 11.8  © 9615-9837 [employer]. Care instructions adapted under license by [employer] (which disclaims liability or warranty for this information). If you have questions about a medical condition or this instruction, always ask your healthcare professional. [place] [address] any warranty or liability for your use of this information. DISCHARGE SUMMARY from Nurse    PATIENT INSTRUCTIONS:      [facility] -  [phone]  [address] [address] [phone]  [address] [address] crisis- [phone]    These are general instructions for a healthy lifestyle:    No smoking/ No tobacco products/ Avoid exposure to second hand smoke  Surgeon General's Warning:  Quitting smoking now greatly reduces serious risk to your health.     Obesity, smoking, and sedentary lifestyle greatly increases your risk for illness    A healthy diet, regular physical exercise & weight monitoring are important for maintaining a healthy lifestyle    You may be retaining fluid if you have a history of heart failure or if you experience any of the following symptoms:  Weight gain of 3 pounds or more overnight or 5 pounds in a week, increased swelling in our hands or feet or shortness of breath while lying flat in bed. Please call your doctor as soon as you notice any of these symptoms; do not wait until your next office visit. Recognize signs and symptoms of STROKE:    F-face looks uneven    A-arms unable to move or move unevenly    S-speech slurred or non-existent    T-time-call 911 as soon as signs and symptoms begin-DO NOT go       Back to bed or wait to see if you get better-TIME IS BRAIN. Warning Signs of HEART ATTACK     Call 911 if you have these symptoms:   Chest discomfort. Most heart attacks involve discomfort in the center of the chest that lasts more than a few minutes, or that goes away and comes back. It can feel like uncomfortable pressure, squeezing, fullness, or pain.  Discomfort in other areas of the upper body. Symptoms can include pain or discomfort in one or both arms, the back, neck, jaw, or stomach.  Shortness of breath with or without chest discomfort.  Other signs may include breaking out in a cold sweat, nausea, or lightheadedness. Don't wait more than five minutes to call 911 - MINUTES MATTER! Fast action can save your life. Calling 911 is almost always the fastest way to get lifesaving treatment. Emergency Medical Services staff can begin treatment when they arrive -- up to an hour sooner than if someone gets to the hospital by car. The discharge information has been reviewed with the patient. The patient verbalized understanding. Discharge medications reviewed with the patient and appropriate educational materials and side effects teaching were provided. [employer] Activation    Thank you for requesting access to [employer]. Please follow the instructions below to securely access and download your online medical record.  [employer] allows you to send messages to your doctor, view your test results, renew your prescriptions, schedule appointments, and more. How Do I Sign Up? 1. In your internet browser, go to [email or web address]  2. Click on the First Time User? Click Here link in the Sign In box. You will be redirect to the New Member Sign Up page. 3. Enter your ITeam Access Code exactly as it appears below. You will not need to use this code after youve completed the sign-up process. If you do not sign up before the expiration date, you must request a new code. ITeam Access Code: [email or web address]  Expires: 2018 10:47 AM (This is the date your ITeam access code will )    4. Enter the last four digits of your Social Security Number (xxxx) and Date of Birth (mm/dd/yyyy) as indicated and click Submit. You will be taken to the next sign-up page. 5. Create a ITeam ID. This will be your ITeam login ID and cannot be changed, so think of one that is secure and easy to remember. 6. Create a ITeam password. You can change your password at any time. 7. Enter your Password Reset Question and Answer. This can be used at a later time if you forget your password. 8. Enter your e-mail address. You will receive e-mail notification when new information is available in [address] 9. Click Sign Up. You can now view and download portions of your medical record. 10. Click the Download Summary menu link to download a portable copy of your medical information. Additional Information    If you have questions, please visit the Frequently Asked Questions section of the ITeam website at [email or web address] Remember, ITeam is NOT to be used for urgent needs.  For medical emergencies, dial 911.      ___________________________________________________________________________________________________________________________________

## 2018-10-05 NOTE — PROGRESS NOTES
Problem: Paranoid Ideation Goal: *LTG: Interact with others without defensiveness or anger Outcome: Progressing Towards Goal 
Pt alert, visible for meds and snacks. Pt engaged appropriately with staff and peers. No med/beh concerns reported. Staff will continue to monitor for health and safety.

## 2018-10-05 NOTE — BH NOTES
Patient appears to be clearing and is more verbal and conversational when spoken to. She shared she was sent to [facility] from skilled nursing due to \"yelling and cursing\". Nursing staff will try to accrue records from that hospitalization. Worker will continue to provide support through treatment and discharge planning.  
  
Judson Fernandez, SUSAN

## 2018-10-05 NOTE — BH NOTES
PSYCHIATRIC PROGRESS NOTE Patient Name  Esha Reyes Date of Birth 1980 [facility] [id number] Medical Record Number  [id number] Age  43 y.o. PCP Aixa Rivera MD  
Admit date:  9/29/2018 Room Number  077/22  @ [facility]  
Date of Service  10/4/2018 PSYCHOTHERAPY SESSION NOTE: 
Length of psychotherapy session: 20 minutes Main condition/diagnosis/issues treated during session today, 10/4/2018 : EPS, mood lability I employed Cognitive Behavioral therapy techniques, Reality-Oriented psychotherapy, as well as supportive psychotherapy in regards to various ongoing psychosocial stressors, including the following: pre-admission and current problems; housing issues;  medical issues; and stress of hospitalization. Interpersonal relationship issues and psychodynamic conflicts explored. Attempts made to alleviate maladaptive patterns. We, also, worked on issues of denial & effects of substance dependency/use Overall, patient is not progressing Treatment Plan Update (reviewed an updated 10/4/2018) : I will modify psychotherapy tx plan by implementing more stress management strategies, building upon cognitive behavioral techniques, increasing coping skills, as well as shoring up psychological defenses). An extended energy and skill set was needed to engage pt in psychotherapy due to some of the following: resistiveness, complexity, negativity, confrontational nature, hostile behaviors, and/or severe abnormalities in thought processes/psychosis resulting in the loss of expressive/receptive language communication skills. E & M PROGRESS NOTE: 
  
 
 
HISTORY  
   
CC:  \"A little better\" HISTORY OF PRESENT ILLNESS/INTERVAL HISTORY:  (reviewed/updated 10/4/2018). per initial evaluation: The patient, Esha Reyes, is a 43 y.o.   WHITE [place]  female with a past psychiatric history significant for opiate dependence, who presents at this time  as a transfer from [facility] where she was admitted for severe EPS and reported paranoia. She was arrested and jailed for a couple of months for drug paraphernalia, then transferred to [facility] forensic unit for psychosis, mood swings, etc. During her hospitalization, she was given Haldol dec monthly x 3. She was released two weeks ago and developed severe EPS- stiffness, rigidity, etc. She went to [facility] and received benadryl and cogentin, improved but noted to have paranoia. Opiate user, not interested in substance abuse treatment. She struggled to explain her symptoms, but she reports having a diagnosis of bipolar disorder. Bryce Adria presents/reports/evidences the following emotional symptoms today, 10/4/2018:severe dystonia, constricted affect, isolative. . The above symptoms have been present for . These symptoms are of high severity. The symptoms are intermittent in nature. Continued improvement. Limited sleep overnight. SIDE EFFECTS: (reviewed/updated 10/4/2018) None reported or admitted to. 
none ALLERGIES:(reviewed/updated 10/4/2018) No Known Allergies MEDICATIONS PRIOR TO ADMISSION:(reviewed/updated 10/4/2018) Prescriptions Prior to Admission Medication Sig  
 benztropine (COGENTIN) 1 mg tablet Take 1 Tab by mouth two (2) times a day.  zolpidem (AMBIEN) 5 mg tablet Take 1 Tab by mouth nightly as needed for Sleep. Max Daily Amount: 5 mg.  diphenhydrAMINE (BENADRYL) 25 mg capsule Take  mg by mouth nightly.  aspirin delayed-release 81 mg tablet Take 81 mg by mouth daily as needed for Pain.  ibuprofen (MOTRIN) 200 mg tablet Take 200 mg by mouth every four (4) hours as needed for Pain.   
  
PAST MEDICAL HISTORY: Past medical history from the initial psychiatric evaluation has been reviewed (reviewed/updated 10/4/2018) with no additional updates (I asked patient and no additional past medical history provided). Past Medical History:  
Diagnosis Date  Attention and concentration deficit  Bipolar disorder ([facility] [address]  Chronic fatigue  Chronic pain  Fibromyalgia 6 years ago Dr. Magdi Appiah  Hearing voices Dr. Snow Garcia  Hx of depression headache  Post herpetic neuralgia  Schizophrenia ([facility] [address]  Shingles 2 weeks ago No past surgical history on file. SOCIAL HISTORY: Social history from the initial psychiatric evaluation has been reviewed (reviewed/updated 10/4/2018) with no additional updates (I asked patient and no additional social history provided). Social History Social History  Marital status: SINGLE Spouse name: N/A  
 Number of children: N/A  
 Years of education: N/A Occupational History  Not on file. Social History Main Topics  Smoking status: Current Every Day Smoker Packs/day: 1.00 Years: 10.00  Smokeless tobacco: Never Used  Alcohol use Not on file Comment: not really  Drug use: No  
 Sexual activity: No  
 
Other Topics Concern  Not on file Social History Narrative Has 2 children FAMILY HISTORY: Family history from the initial psychiatric evaluation has been reviewed (reviewed/updated 10/4/2018) with no additional updates (I asked patient and no additional family history provided). Family History Problem Relation Age of Onset  Diabetes Mother  Thyroid Disease Mother  COPD Mother  Stroke Paternal Grandfather  Heart Attack Father At age 39  Bipolar Disorder Father REVIEW OF SYSTEMS: (reviewed/updated 10/4/2018) Appetite:good Sleep: good All other Review of Systems: Negative except paranoia [place] MENTAL STATUS EXAM (MSE): MSE FINDINGS ARE WITHIN NORMAL LIMITS (WNL) UNLESS OTHERWISE STATED BELOW. ( ALL OF THE BELOW CATEGORIES OF THE MSE HAVE BEEN REVIEWED (reviewed 10/4/2018) AND UPDATED AS DEEMED APPROPRIATE ) General Presentation age appropriate and casually dressed, cooperative Orientation oriented to time, place and person Vital Signs  See below (reviewed 10/4/2018); Vital Signs (BP, Pulse, & Temp) are within normal limits if not listed below. Gait and Station Stable/steady, no ataxia Musculoskeletal System (+) extrapyramidal symptoms (EPS); constricted affect;limited spontaneous movements Language No aphasia or dysarthria Speech:  dyasrthria Thought Processes (+)logical; normal rate of thoughts; poor abstract reasoning/computation Thought Associations loose associations and tangential  
Thought Content not internally preoccupied Suicidal Ideations none Homicidal Ideations none Mood:  labile Affect:  labile Memory recent  fair Memory remote:  fair Concentration/Attention:  distractable Fund of Knowledge avg Insight:  fair Reliability fair Judgment:  fair VITALS:    
No data found. Wt Readings from Last 3 Encounters:  
09/26/18 79.4 kg (175 lb)  
07/22/17 75.6 kg (166 lb 10.7 oz) 10/28/14 74 kg (163 lb 3.2 oz) Temp Readings from Last 3 Encounters:  
10/03/18 98.3 °F (36.8 °C)  
09/29/18 98.2 °F (36.8 °C)  
07/22/17 98.1 °F (36.7 °C) BP Readings from Last 3 Encounters:  
10/03/18 104/73  
09/29/18 120/67  
07/22/17 124/76 Pulse Readings from Last 3 Encounters:  
10/03/18 81  
09/29/18 60  
07/22/17 72 DATA LABORATORY DATA:(reviewed/updated 10/4/2018) Recent Results (from the past 24 hour(s)) VALPROIC ACID Collection Time: 10/04/18  4:25 AM  
Result Value Ref Range Valproic acid 123 (H) 50 - 100 ug/ml Lab Results Component Value Date/Time Valproic acid 123 (H) 10/04/2018 04:25 AM  
 
No results found for: LITHM  
RADIOLOGY REPORTS:(reviewed/updated 10/4/2018) No results found. MEDICATIONS ALL MEDICATIONS:  
Current Facility-Administered Medications Medication Dose Route Frequency  benztropine (COGENTIN) tablet 2 mg  2 mg Oral TID  divalproex DR (DEPAKOTE) tablet 500 mg  500 mg Oral BID  ziprasidone (GEODON) 20 mg in sterile water (preservative free) 1 mL injection  20 mg IntraMUSCular BID PRN  
 OLANZapine (ZyPREXA) tablet 5 mg  5 mg Oral Q6H PRN  
 benztropine (COGENTIN) tablet 2 mg  2 mg Oral BID PRN  
 benztropine (COGENTIN) injection 2 mg  2 mg IntraMUSCular BID PRN  
 LORazepam (ATIVAN) injection 2 mg  2 mg IntraMUSCular Q4H PRN  
 LORazepam (ATIVAN) tablet 1 mg  1 mg Oral Q4H PRN  
 zolpidem (AMBIEN) tablet 10 mg  10 mg Oral QHS PRN  
 acetaminophen (TYLENOL) tablet 650 mg  650 mg Oral Q4H PRN  
 ibuprofen (MOTRIN) tablet 400 mg  400 mg Oral Q8H PRN  
 magnesium hydroxide (MILK OF MAGNESIA) 400 mg/5 mL oral suspension 30 mL  30 mL Oral DAILY PRN  
 nicotine (NICODERM CQ) 21 mg/24 hr patch 1 Patch  1 Patch TransDERmal DAILY PRN  
  
SCHEDULED MEDICATIONS:  
Current Facility-Administered Medications Medication Dose Route Frequency  benztropine (COGENTIN) tablet 2 mg  2 mg Oral TID  divalproex DR (DEPAKOTE) tablet 500 mg  500 mg Oral BID ASSESSMENT & PLAN  
 
DIAGNOSES REQUIRING ACTIVE TREATMENT AND MONITORING: (reviewed/updated 10/4/2018) Patient Active Hospital Problem List: 
  
 Extrapyramidal and movement disorder (9/30/2018) Assessment: severe Plan: Increase dose of cogentin to 1mg tid Hold antipsychotics Bipolar disorder Assessment: mood lability of unclear etiology Plan: depakote DR 500mg twice daily 10/4- reviewed vpa, high normal level; check CMP I will continue to monitor blood levels (Depakote, Tegretol, lithium, clozapine---a drug with a narrow therapeutic index= NTI) and associated labs for drug therapy implemented that require intense monitoring for toxicity as deemed appropriate based on current medication side effects and pharmacodynamically determined drug 1/2 lives. In summary, Vaishali Colindres, is a 43 y.o.  female who presents with a severe exacerbation of the principal diagnosis of Psychosis ([address] Patient's condition is improving. Patient requires continued inpatient hospitalization for further stabilization, safety monitoring and medication management. I will continue to coordinate the provision of individual, milieu, occupational, group, and substance abuse therapies to address target symptoms/diagnoses as deemed appropriate for the individual patient. A coordinated, multidisplinary treatment team round was conducted with the patient (this team consists of the nurse, psychiatric unit pharmcist,  and writer). Complete current electronic health record for patient has been reviewed today including consultant notes, ancillary staff notes, nurses and psychiatric tech notes. Suicide risk assessment completed and patient deemed to be of low risk for suicide at this time. The following regarding medications was addressed during rounds with patient:  
the risks and benefits of the proposed medication. The patient was given the opportunity to ask questions. Informed consent given to the use of the above medications. Will continue to adjust psychiatric and non-psychiatric medications (see above \"medication\" section and orders section for details) as deemed appropriate & based upon diagnoses and response to treatment. I will continue to order blood tests/labs and diagnostic tests as deemed appropriate and review results as they become available (see orders for details and above listed lab/test results). I will order psychiatric records from previous [facility] hospitals to further elucidate the nature of patient's psychopathology and review once available.  
 
I will gather additional collateral information from friends, family and o/p treatment team to further elucidate the nature of patient's psychopathology and baselline level of psychiatric functioning. I certify that this patient's inpatient psychiatric hospital services furnished since the previous certification were, and continue to be, required for treatment that could reasonably be expected to improve the patient's condition, or for diagnostic study, and that the patient continues to need, on a daily basis, active treatment furnished directly by or requiring the supervision of inpatient psychiatric facility personnel. In addition, the hospital records show that services furnished were intensive treatment services, admission or related services, or equivalent services. EXPECTED DISCHARGE DATE/DAY: TBD  
 
DISPOSITION: Home Signed By:  
Blanca Waldron MD 
10/4/2018

## 2018-10-05 NOTE — BH NOTES
Behavioral Health Transition Record to Provider Patient Name: Shilo Xie YOB: 1980 Medical Record Number: [id number] Date of Admission: 9/29/2018 Date of Discharge: 10/5/18 Attending Provider: Ying Mays MD 
Discharging Provider: Dr. Timmy Brito To contact this individual call [phone] and ask the  to page. If unavailable, ask to be transferred to [facility] Provider on call. [facility] Provider will be available on call 24/7 and during holidays. Primary Care Provider: Chris Dee MD 
 
No Known Allergies Reason for Admission: EPS Admission Diagnosis: Schizophrenia Schizophrenia ([facility] [address] * No surgery found * Results for orders placed or performed during the hospital encounter of 09/29/18 GLUCOSE, FASTING Result Value Ref Range Glucose 97 65 - 100 MG/DL  
TSH 3RD GENERATION Result Value Ref Range TSH 1.21 0.36 - 3.74 uIU/mL  
LIPID PANEL Result Value Ref Range LIPID PROFILE Cholesterol, total 107 <200 MG/DL Triglyceride 68 <150 MG/DL  
 HDL Cholesterol 49 MG/DL  
 LDL, calculated 44.4 0 - 100 MG/DL VLDL, calculated 13.6 MG/DL  
 CHOL/HDL Ratio 2.2 0 - 5.0 VALPROIC ACID Result Value Ref Range Valproic acid 123 (H) 50 - 100 ug/ml Immunizations administered during this encounter:  
Immunization History Administered Date(s) Administered  Influenza Vaccine (Quad) PF 09/29/2018 Screening for Metabolic Disorders for Patients on Antipsychotic Medications 
(Data obtained from the EMR) Estimated Body Mass Index Estimated body mass index is 29.12 kg/(m^2) as calculated from the following: 
  Height as of 9/26/18: 5' 5\" (1.651 m). Weight as of 9/26/18: 79.4 kg (175 lb). Vital Signs/Blood Pressure Visit Vitals  /73 (BP 1 Location: Left arm, BP Patient Position: Post activity; Sitting)  Pulse 81  Temp 98.3 °F (36.8 °C)  Resp 16  SpO2 97% Blood Glucose/Hemoglobin A1c Lab Results Component Value Date/Time Glucose 97 09/30/2018 04:53 AM  
 
 
Lab Results Component Value Date/Time Hemoglobin A1c 5.4 07/12/2012 10:00 AM  
 
  
Lipid Panel Lab Results Component Value Date/Time Cholesterol, total 107 09/30/2018 04:53 AM  
 HDL Cholesterol 49 09/30/2018 04:53 AM  
 LDL, calculated 44.4 09/30/2018 04:53 AM  
 Triglyceride 68 09/30/2018 04:53 AM  
 CHOL/HDL Ratio 2.2 09/30/2018 04:53 AM  
 
  
Discharge Diagnosis: EPS and bipolar disorder Discharge Plan:  The patient continues to have a clearer affect, exhibits no signs/symptoms of being a harm to herself or others and was discharged. She will be transported home via [facility] cab (ph: [phone]) confirmation number: [id number]. Home health and [facility] will contact the patient to set up the first appointments ([facility] will likely go to the patient's house Monday; Home health likely over the weekend). [facility] and continuing care paperwork was faxed. Discharge Medication List and Instructions:  
Current Discharge Medication List  
  
START taking these medications Details  
divalproex DR (DEPAKOTE) 500 mg tablet Take 1 Tab by mouth two (2) times a day. Indications: schizophrenia Qty: 30 Tab, Refills: 1 CONTINUE these medications which have CHANGED Details  
benztropine (COGENTIN) 2 mg tablet Take 1 Tab by mouth three (3) times daily. Indications: drug-induced extrapyramidal reaction 
Qty: 45 Tab, Refills: 1 CONTINUE these medications which have NOT CHANGED Details  
aspirin delayed-release 81 mg tablet Take 81 mg by mouth daily as needed for Pain. ibuprofen (MOTRIN) 200 mg tablet Take 200 mg by mouth every four (4) hours as needed for Pain. STOP taking these medications  
  
 zolpidem (AMBIEN) 5 mg tablet Comments:  
Reason for Stopping: diphenhydrAMINE (BENADRYL) 25 mg capsule Comments:  
Reason for Stopping:   
   
  
 
 
Unresulted Labs (24h ago through future) Start     Ordered 40/33/13 1816  METABOLIC PANEL, COMPREHENSIVE  TOMORROW AM,   R    
 10/04/18 2137 To obtain results of studies pending at discharge, please contact N/A Follow-up Information Follow up With Details Comments Contact Info [address] [facility]  Please follow up with [facility] on Thurs. 10/11/18 at [address], [address] [facility] Tereza Pickard ph: [phone] 
fax: [phone] [facility] Mental health support services  Melissa Ariane from [facility] mental health support services will contact you and come to your home to begin services [phone] [address] will contact you to schedule when they first come meet you in your home. You may reach out to them if you desire as well Ph: [phone] Ashlee Guillen MD   [address] [address] [phone] Advanced Directive:  
Does the patient have an appointed surrogate decision maker? No 
Does the patient have a Medical Advance Directive? No 
Does the patient have a Psychiatric Advance Directive? No 
If the patient does not have a surrogate or Medical Advance Directive AND Psychiatric Advance Directive, the patient was offered information on these advance directives Other No 
 
Patient Instructions: Please continue all medications until otherwise directed by physician. Tobacco Cessation Discharge Plan:  
Is the patient a smoker and needs referral for smoking cessation? No 
Patient referred to the following for smoking cessation with an appointment? No  
Patient was offered medication to assist with smoking cessation at discharge? No 
Was education for smoking cessation added to the discharge instructions? No 
 
Alcohol/Substance Abuse Discharge Plan: Does the patient have a history of substance/alcohol abuse and requires a referral for treatment? Yes Patient referred to the following for substance/alcohol abuse treatment with an appointment? Yes Patient was offered medication to assist with alcohol cessation at discharge? No 
Was education for substance/alcohol abuse added to discharge instructions? No 
 
Patient discharged to Home; discussed with patient/caregiver

## 2018-10-19 NOTE — DISCHARGE SUMMARY
PSYCHIATRIC DISCHARGE SUMMARY         IDENTIFICATION:    Patient Name  Bryce Covington   Date of Birth 1980   [facility] [id number]   Medical Record Number  [id number]      Age  43 y.o. PCP Karyn Vela MD   Admit date:  9/29/2018    Discharge date: 10/5/18   Room Number  46/26  @ [address]   Date of Service  10/5/18            TYPE OF DISCHARGE:REGULAR               CONDITION AT DISCHARGE: improved       PROVISIONAL & DISCHARGE DIAGNOSES:    Problem List  Date Reviewed: 9/27/2018          Codes Class    Extrapyramidal and movement disorder ICD-10-CM: G25.9  ICD-9-CM: 333.90         Dystonia ICD-10-CM: G24.9  ICD-9-CM: 781.0         UTI (urinary tract infection) ICD-10-CM: N39.0  ICD-9-CM: 599.0         Chronic pain (Chronic) ICD-10-CM: G89.29  ICD-9-CM: 338.29         * (Principal) Psychosis ([facility] [address] (Chronic) ICD-10-CM: F29  ICD-9-CM: 298.9         ADHD (attention deficit hyperactivity disorder) ICD-10-CM: F90.9  ICD-9-CM: 314.01         Low back pain ICD-10-CM: M54.5  ICD-9-CM: 724.2         Fibromyalgia (Chronic) ICD-10-CM: M79.7  ICD-9-CM: 729.1         Ketonuria ICD-10-CM: R82.4  ICD-9-CM: 791.6         Proteinuria ICD-10-CM: R80.9  ICD-9-CM: 791.0               Active Hospital Problems    Extrapyramidal and movement disorder      *Psychosis ([facility]        DISCHARGE DIAGNOSIS:   Axis I:  SEE ABOVE  Axis II: SEE ABOVE  Axis III: SEE ABOVE  Axis IV:  Limited supports[place], lack of transportation; lack of structure  Axis V:  45 on admission, 55 on discharge 65(baseline)       CC & HISTORY OF PRESENT ILLNESS:  The patient, Bryce Covington, is a 43 y.o. WHITE [place]  female with a past psychiatric history significant for opiate dependence, who presents at this time  as a transfer from [facility] where she was admitted for severe EPS and reported paranoia.  She was arrested and jailed for a couple of months for drug paraphernalia, then transferred to [facility] forensic unit for psychosis, mood swings, etc. During her hospitalization, she was given Haldol dec monthly x 3. She was released two weeks ago and developed severe EPS- stiffness, rigidity, etc.   She went to [facility] and received benadryl and cogentin, improved but noted to have paranoia. Opiate user, not interested in substance abuse treatment. She struggled to explain her symptoms, but she reports having a diagnosis of bipolar disorder. SOCIAL HISTORY:    Social History     Socioeconomic History    Marital status: SINGLE     Spouse name: Not on file    Number of children: Not on file    Years of education: Not on file    Highest education level: Not on file   Social Needs    Financial resource strain: Not on file    Food insecurity - worry: Not on file    Food insecurity - inability: Not on file    Transportation needs - medical: Not on file   [employer] needs - non-medical: Not on file   Occupational History    Not on file   Tobacco Use    Smoking status: Current Every Day Smoker     Packs/day: 1.00     Years: 10.00     Pack years: 10.00    Smokeless tobacco: Never Used   Substance and Sexual Activity    Alcohol use: Not on file     Comment: not really    Drug use: No    Sexual activity: No     Birth control/protection: IUD   Other Topics Concern    Not on file   Social History Narrative      Has 2 children      FAMILY HISTORY:   Family History   Problem Relation Age of Onset    Diabetes Mother     Thyroid Disease Mother     COPD Mother     Stroke Paternal Grandfather     Heart Attack Father         At age 39   Del Goon Bipolar Disorder Father              HOSPITALIZATION COURSE:    Shilo Xie was admitted to the inpatient psychiatric unit [facility] for acute psychiatric stabilization in regards to symptomatology as described in the HPI above.  The differential diagnosis at time of admission included: severe EPS and schizoaffective disorder vs. Psychosis nos   While on the unit Cholo Archer was involved in individual, group, occupational and milieu therapy. Psychiatric medications were adjusted during this hospitalization including (see below). Cholo Archer demonstrated a  progressive improvement in overall condition. Severe EPS secondary to IM Haldol she received prior to admission. No overt psychosis noted, mood stable at time of discharge. Please see individual progress notes for more specific details regarding patient's hospitalization course.             LABS AND IMAGING:    Labs Reviewed   VALPROIC ACID - Abnormal; Notable for the following components:       Result Value    Valproic acid 123 (*)     All other components within normal limits   GLUCOSE, FASTING   TSH 3RD GENERATION   LIPID PANEL     Lab Results   Component Value Date/Time    Valproic acid 123 (H) 10/04/2018 04:25 AM     Admission on 09/29/2018, Discharged on 10/05/2018   Component Date Value Ref Range Status    Glucose 09/30/2018 97  65 - 100 MG/DL Final    TSH 09/30/2018 1.21  0.36 - 3.74 uIU/mL Final    LIPID PROFILE 09/30/2018        Final    Cholesterol, total 09/30/2018 107  <200 MG/DL Final    Triglyceride 09/30/2018 68  <150 MG/DL Final    HDL Cholesterol 09/30/2018 49  MG/DL Final    LDL, calculated 09/30/2018 44.4  0 - 100 MG/DL Final    VLDL, calculated 09/30/2018 13.6  MG/DL Final    CHOL/HDL Ratio 09/30/2018 2.2  0 - 5.0   Final    Valproic acid 10/04/2018 123* 50 - 100 ug/ml Final   Admission on 09/26/2018, Discharged on 09/29/2018   Component Date Value Ref Range Status    Ventricular Rate 09/26/2018 83  BPM Final    Atrial Rate 09/26/2018 85  BPM Final    QRS Duration 09/26/2018 62  ms Final    Q-T Interval 09/26/2018 348  ms Final    QTC Calculation (Bezet) 09/26/2018 408  ms Final    Calculated R Axis 09/26/2018 -17  degrees Final    Calculated T Axis 09/26/2018 -27  degrees Final    Diagnosis 09/26/2018    Final Value: Sinus rhythm  No previous ECGs available  Confirmed by Ezekiel Cantor ([id number]) on 9/26/2018 2:39:37 PM      WBC 09/26/2018 10.1  3.6 - 11.0 K/uL Final    RBC 09/26/2018 4.41  3.80 - 5.20 M/uL Final    HGB 09/26/2018 14.2  11.5 - 16.0 g/dL Final    HCT 09/26/2018 40.3  35.0 - 47.0 % Final    MCV 09/26/2018 91.4  80.0 - 99.0 FL Final    MCH 09/26/2018 32.2  26.0 - 34.0 PG Final    MCHC 09/26/2018 35.2  30.0 - 36.5 g/dL Final    RDW 09/26/2018 12.1  11.5 - 14.5 % Final    PLATELET 65/33/1912 465  150 - 400 K/uL Final    MPV 09/26/2018 10.4  8.9 - 12.9 FL Final    NRBC 09/26/2018 0.0  0  WBC Final    ABSOLUTE NRBC 09/26/2018 0.00  0.00 - 0.01 K/uL Final    NEUTROPHILS 09/26/2018 76* 32 - 75 % Final    LYMPHOCYTES 09/26/2018 16  12 - 49 % Final    MONOCYTES 09/26/2018 7  5 - 13 % Final    EOSINOPHILS 09/26/2018 0  0 - 7 % Final    BASOPHILS 09/26/2018 0  0 - 1 % Final    IMMATURE GRANULOCYTES 09/26/2018 0  0.0 - 0.5 % Final    ABS. NEUTROPHILS 09/26/2018 7.7  1.8 - 8.0 K/UL Final    ABS. LYMPHOCYTES 09/26/2018 1.6  0.8 - 3.5 K/UL Final    ABS. MONOCYTES 09/26/2018 0.7  0.0 - 1.0 K/UL Final    ABS. EOSINOPHILS 09/26/2018 0.0  0.0 - 0.4 K/UL Final    ABS. BASOPHILS 09/26/2018 0.0  0.0 - 0.1 K/UL Final    ABS. IMM.  GRANS. 09/26/2018 0.0  0.00 - 0.04 K/UL Final    DF 09/26/2018 AUTOMATED    Final    Sodium 09/26/2018 137  136 - 145 mmol/L Final    Potassium 09/26/2018 3.6  3.5 - 5.1 mmol/L Final    Chloride 09/26/2018 99  97 - 108 mmol/L Final    CO2 09/26/2018 26  21 - 32 mmol/L Final    Anion gap 09/26/2018 12  5 - 15 mmol/L Final    Glucose 09/26/2018 97  65 - 100 mg/dL Final    BUN 09/26/2018 9  6 - 20 MG/DL Final    Creatinine 09/26/2018 1.06* 0.55 - 1.02 MG/DL Final    BUN/Creatinine ratio 09/26/2018 8* 12 - 20   Final    GFR est AA 09/26/2018 >60  >60 ml/min/1.73m2 Final    GFR est non-AA 09/26/2018 58* >60 ml/min/1.73m2 Final    Calcium 09/26/2018 9.7  8.5 - 10.1 MG/DL Final    Bilirubin, total 09/26/2018 0.9  0.2 - 1.0 MG/DL Final    ALT (SGPT) 09/26/2018 49  12 - 78 U/L Final    AST (SGOT) 09/26/2018 48* 15 - 37 U/L Final    Alk.  phosphatase 09/26/2018 52  45 - 117 U/L Final    Protein, total 09/26/2018 8.1  6.4 - 8.2 g/dL Final    Albumin 09/26/2018 4.5  3.5 - 5.0 g/dL Final    Globulin 09/26/2018 3.6  2.0 - 4.0 g/dL Final    A-G Ratio 09/26/2018 1.3  1.1 - 2.2   Final    Magnesium 09/26/2018 2.1  1.6 - 2.4 mg/dL Final    Color 09/26/2018 DARK YELLOW    Final    Appearance 09/26/2018 CLOUDY* CLEAR   Final    Specific gravity 09/26/2018 1.021  1.003 - 1.030   Final    pH (UA) 09/26/2018 6.0  5.0 - 8.0   Final    Protein 09/26/2018 TRACE* NEG mg/dL Final    Glucose 09/26/2018 NEGATIVE   NEG mg/dL Final    Ketone 09/26/2018 40* NEG mg/dL Final    Blood 09/26/2018 NEGATIVE   NEG   Final    Urobilinogen 09/26/2018 1.0  0.2 - 1.0 EU/dL Final    Nitrites 09/26/2018 POSITIVE* NEG   Final    Leukocyte Esterase 09/26/2018 SMALL* NEG   Final    WBC 09/26/2018 5-10  0 - 4 /hpf Final    RBC 09/26/2018 0-5  0 - 5 /hpf Final    Epithelial cells 09/26/2018 MODERATE* FEW /lpf Final    Bacteria 09/26/2018 3+* NEG /hpf Final    UA:UC IF INDICATED 09/26/2018 URINE CULTURE ORDERED* CNI   Final    Mucus 09/26/2018 TRACE* NEG /lpf Final    CA Oxalate crystals 09/26/2018 FEW* NEG   Final    Bilirubin UA, confirm 09/26/2018 NEGATIVE   NEG   Final    Pregnancy test,urine (POC) 09/26/2018 NEGATIVE   NEG   Final    Special Requests: 09/26/2018     Final                    Value:NO SPECIAL REQUESTS  Reflexed from [id number]      [place] Count 09/26/2018     Final                    Value:>100,000  COLONIES/mL      Culture result: 09/26/2018 STAPHYLOCOCCUS EPIDERMIDIS*   Final    Sodium 09/27/2018 137  136 - 145 mmol/L Final    Potassium 09/27/2018 3.5  3.5 - 5.1 mmol/L Final    Chloride 09/27/2018 103  97 - 108 mmol/L Final    CO2 09/27/2018 25  21 - 32 mmol/L Final  Anion gap 09/27/2018 9  5 - 15 mmol/L Final    Glucose 09/27/2018 94  65 - 100 mg/dL Final    BUN 09/27/2018 10  6 - 20 MG/DL Final    Creatinine 09/27/2018 1.00  0.55 - 1.02 MG/DL Final    BUN/Creatinine ratio 09/27/2018 10* 12 - 20   Final    GFR est AA 09/27/2018 >60  >60 ml/min/1.73m2 Final    GFR est non-AA 09/27/2018 >60  >60 ml/min/1.73m2 Final    Calcium 09/27/2018 8.9  8.5 - 10.1 MG/DL Final    Bilirubin, total 09/27/2018 0.8  0.2 - 1.0 MG/DL Final    ALT (SGPT) 09/27/2018 45  12 - 78 U/L Final    AST (SGOT) 09/27/2018 70* 15 - 37 U/L Final    Alk. phosphatase 09/27/2018 47  45 - 117 U/L Final    Protein, total 09/27/2018 6.9  6.4 - 8.2 g/dL Final    Albumin 09/27/2018 3.8  3.5 - 5.0 g/dL Final    Globulin 09/27/2018 3.1  2.0 - 4.0 g/dL Final    A-G Ratio 09/27/2018 1.2  1.1 - 2.2   Final    WBC 09/27/2018 8.4  3.6 - 11.0 K/uL Final    RBC 09/27/2018 4.06  3.80 - 5.20 M/uL Final    HGB 09/27/2018 12.9  11.5 - 16.0 g/dL Final    HCT 09/27/2018 38.1  35.0 - 47.0 % Final    MCV 09/27/2018 93.8  80.0 - 99.0 FL Final    MCH 09/27/2018 31.8  26.0 - 34.0 PG Final    MCHC 09/27/2018 33.9  30.0 - 36.5 g/dL Final    RDW 09/27/2018 12.1  11.5 - 14.5 % Final    PLATELET 37/31/0523 850  150 - 400 K/uL Final    MPV 09/27/2018 10.7  8.9 - 12.9 FL Final    NRBC 09/27/2018 0.0  0  WBC Final    ABSOLUTE NRBC 09/27/2018 0.00  0.00 - 0.01 K/uL Final    NEUTROPHILS 09/27/2018 42  32 - 75 % Final    LYMPHOCYTES 09/27/2018 44  12 - 49 % Final    MONOCYTES 09/27/2018 9  5 - 13 % Final    EOSINOPHILS 09/27/2018 4  0 - 7 % Final    BASOPHILS 09/27/2018 1  0 - 1 % Final    IMMATURE GRANULOCYTES 09/27/2018 0  0.0 - 0.5 % Final    ABS. NEUTROPHILS 09/27/2018 3.6  1.8 - 8.0 K/UL Final    ABS. LYMPHOCYTES 09/27/2018 3.7* 0.8 - 3.5 K/UL Final    ABS. MONOCYTES 09/27/2018 0.8  0.0 - 1.0 K/UL Final    ABS. EOSINOPHILS 09/27/2018 0.3  0.0 - 0.4 K/UL Final    ABS.  BASOPHILS 09/27/2018 0.1  0.0 - 0.1 K/UL Final    ABS. IMM. GRANS. 09/27/2018 0.0  0.00 - 0.04 K/UL Final    DF 09/27/2018 AUTOMATED    Final     No results found. DISPOSITION:    Home. Patient to f/u with drug/etoh rehabilitation, psychiatric, and psychotherapy appointments. Patient is to f/u with internist as directed. Patient should have a depakote level and associated labs checked within the next 1-2 weeks by patient's o/p psychiatrist/internist.               FOLLOW-UP CARE:    Activity as tolerated  Regular Diet  Wound Care: none needed. Follow-up Information     Follow up With Specialties Details Why [address] [facility]   [facility] [address] [facility], [address] [facility] Tereza Pickard  ph: [phone]  fax: [phone] Mental health support services   Oksana Griffin from [facility] mental health support services will contact you and come to your home to begin services [phone]    [facility]  will contact you to schedule when they first come meet you in your home. You may reach out to them if you desire as well Ph: [address] [facility] , [address] [facility], [address]  [phone]                   PROGNOSIS:   Good ---- based on nature of patient's pathology/ies and treatment compliance issues. Prognosis is greatly dependent upon patient's ability to remain sober and to follow up with drug/etoh rehabilitation and psychiatric/psychotherapy appointments as well as to comply with psychiatric medications as prescribed. DISCHARGE MEDICATIONS:     Informed consent given for the use of following psychotropic medications:  Discharge Medication List as of 10/5/2018  1:09 PM      START taking these medications    Details   divalproex DR (DEPAKOTE) 500 mg tablet Take 1 Tab by mouth two (2) times a day.  Indications: schizophrenia, Print, Disp-30 Tab, R-1         CONTINUE these medications which have CHANGED    Details   benztropine (COGENTIN) 2 mg tablet Take 1 Tab by mouth three (3) times daily. Indications: drug-induced extrapyramidal reaction, Print, Disp-45 Tab, R-1         CONTINUE these medications which have NOT CHANGED    Details   aspirin delayed-release 81 mg tablet Take 81 mg by mouth daily as needed for Pain., Historical Med      ibuprofen (MOTRIN) 200 mg tablet Take 200 mg by mouth every four (4) hours as needed for Pain., Historical Med         STOP taking these medications       zolpidem (AMBIEN) 5 mg tablet Comments:   Reason for Stopping:         diphenhydrAMINE (BENADRYL) 25 mg capsule Comments:   Reason for Stopping:                      A coordinated, multidisplinary treatment team round was conducted with Aundrea Bryant is done daily here at [facility]. This team consists of the nurse, psychiatric unit pharmcist,  and writer. I have spent greater than 35 minutes on discharge work.     Signed:  Ying Mays MD  10/5/18

## 2022-03-18 PROBLEM — G89.29 CHRONIC PAIN: Status: ACTIVE | Noted: 2018-09-26

## 2022-03-18 PROBLEM — G25.9 EXTRAPYRAMIDAL AND MOVEMENT DISORDER: Status: ACTIVE | Noted: 2018-09-30

## 2022-03-18 PROBLEM — N39.0 UTI (URINARY TRACT INFECTION): Status: ACTIVE | Noted: 2018-09-26

## 2022-03-19 PROBLEM — G24.9 DYSTONIA: Status: ACTIVE | Noted: 2018-09-26

## 2022-03-19 PROBLEM — F29 PSYCHOSIS (HCC): Status: ACTIVE | Noted: 2018-09-26

## 2024-07-11 ENCOUNTER — TRANSCRIBE ORDERS (OUTPATIENT)
Facility: HOSPITAL | Age: 44
End: 2024-07-11

## 2024-07-11 DIAGNOSIS — R74.8 ELEVATED LIVER ENZYMES: Primary | ICD-10-CM

## 2024-07-25 ENCOUNTER — HOSPITAL ENCOUNTER (OUTPATIENT)
Facility: HOSPITAL | Age: 44
Discharge: HOME | End: 2024-07-25
Payer: MEDICAID

## 2024-07-25 DIAGNOSIS — R74.8 ELEVATED LIVER ENZYMES: ICD-10-CM

## 2024-07-25 PROCEDURE — 76705 ECHO EXAM OF ABDOMEN: CPT

## 2025-08-22 ENCOUNTER — CLINICAL DOCUMENTATION (OUTPATIENT)
Age: 45
End: 2025-08-22